# Patient Record
Sex: MALE | Race: WHITE | NOT HISPANIC OR LATINO | Employment: FULL TIME | URBAN - METROPOLITAN AREA
[De-identification: names, ages, dates, MRNs, and addresses within clinical notes are randomized per-mention and may not be internally consistent; named-entity substitution may affect disease eponyms.]

---

## 2017-02-02 ENCOUNTER — ALLSCRIPTS OFFICE VISIT (OUTPATIENT)
Dept: OTHER | Facility: OTHER | Age: 27
End: 2017-02-02

## 2017-03-03 ENCOUNTER — ALLSCRIPTS OFFICE VISIT (OUTPATIENT)
Dept: OTHER | Facility: OTHER | Age: 27
End: 2017-03-03

## 2018-01-11 NOTE — PROGRESS NOTES
Assessment    1  Encounter for preventive health examination (V70 0) (Z00 00)    Plan  Health Maintenance    · Follow-up visit in 1 year Evaluation and Treatment  Follow-up  Status: Hold For -  Scheduling  Requested for: [id number]   · Begin a limited exercise program ; Status:Complete;   Done: [id number] 03:56PM   · Begin or continue regular aerobic exercise  Gradually work up to at least 3 sessions of 30  minutes of exercise a week ; Status:Complete;   Done: 78ZGX7655 03:56PM   · Brush your teeth 3 times a day and floss at least once a day ; Status:Complete;   Done:  44CPD0981 03:56PM   · Drink plenty of fluids ; Status:Complete;   Done: 47GAU0000 03:56PM   · Eat a low fat and low cholesterol diet ; Status:Complete;   Done: 20XLC6191 03:56PM   · Use a sun block product with an SPF of 15 or more ; Status:Complete;   Done:  62FYK6999 03:56PM   · Vitamins can help you get daily requirements that your diet may not be giving you ;  Status:Complete;   Done: 95NLQ7755 03:56PM   · We recommend routine visits to a dentist ; Status:Complete;   Done: 30DER3834 03:56PM    Discussion/Summary  Impression: health maintenance visit  Currently, he eats an adequate diet and has an adequate exercise regimen  Prostate cancer screening: the risks and benefits of prostate cancer screening were discussed and PSA was ordered  Testicular cancer screening: the risks and benefits of testicular cancer screening were discussed and monthly self testicular exam was advised  Colorectal cancer screening: the risks and benefits of colorectal cancer screening were discussed and colorectal cancer screening is not indicated  The immunizations are up to date  Advice and education were given regarding aerobic exercise, weight bearing exercise, weight loss, calcium supplements, vitamin D supplements, reproductive health, cardiovascular risk reduction, alcohol use, sunscreen use, helmet use and seat belt use        Chief Complaint  pt here for physical/ History of Present Illness  HM, Adult Male: The patient is being seen for a health maintenance evaluation  The last health maintenance visit was 1 year(s) ago  General Health: The patient's health since the last visit is described as good  He has regular dental visits  He denies vision problems  Vision care includes previous LASIK eye surgery  He denies hearing loss  Immunizations status: not up to date  Lifestyle:  He consumes a diverse and healthy diet  He does not have any weight concerns  He exercises regularly  He does not use tobacco  He consumes alcohol  He denies drug use  Reproductive health:  the patient is sexually active  birth control is not being practiced  He denies erectile dysfunction  Screening: cancer screening reviewed and updated  metabolic screening reviewed and updated  risk screening reviewed and updated  Review of Systems    Constitutional: No fever or chills, feels well, no tiredness, no recent weight gain or weight loss  Eyes: No complaints of eye pain, no red eyes, no discharge from eyes, no itchy eyes  ENT: no complaints of earache, no hearing loss, no nosebleeds, no nasal discharge, no sore throat, no hoarseness  Cardiovascular: No complaints of slow heart rate, no fast heart rate, no chest pain, no palpitations, no leg claudication, no lower extremity  Respiratory: No complaints of shortness of breath, no wheezing, no cough, no SOB on exertion, no orthopnea or PND  Gastrointestinal: No complaints of abdominal pain, no constipation, no nausea or vomiting, no diarrhea or bloody stools  Genitourinary: No complaints of dysuria, no incontinence, no hesitancy, no nocturia, no genital lesion, no testicular pain  Musculoskeletal: No complaints of arthralgia, no myalgias, no joint swelling or stiffness, no limb pain or swelling  Integumentary: No complaints of skin rash or skin lesions, no itching, no skin wound, no dry skin     Neurological: No compliants of headache, no confusion, no convulsions, no numbness or tingling, no dizziness or fainting, no limb weakness, no difficulty walking  Psychiatric: Is not suicidal, no sleep disturbances, no anxiety or depression, no change in personality, no emotional problems  Endocrine: No complaints of proptosis, no hot flashes, no muscle weakness, no erectile dysfunction, no deepening of the voice, no feelings of weakness  Hematologic/Lymphatic: No complaints of swollen glands, no swollen glands in the neck, does not bleed easily, no easy bruising  Past Medical History    · History of Internal derangement of knee, left (717 9) (M23 92)    Family History  Mother    · Family history of Hodgkin's lymphoma (V16 7) (Z80 7)    Social History    · Never a smoker   · Social alcohol use (Z78 9)    Allergies    1  No Known Drug Allergies    Vitals   Recorded: 60AUG2517 03:54PM Recorded: 66MME2721 03:39PM   Heart Rate  78   Respiration  18   Systolic 486, LUE, Sitting 420   Diastolic 80, LUE, Sitting 96   Height  6 ft 1 86 in   Weight  272 lb    BMI Calculated  35 06   BSA Calculated  2 47     Physical Exam    Constitutional   General appearance: No acute distress, well appearing and well nourished  Head and Face   Head and face: Normal     Palpation of the face and sinuses: No sinus tenderness  Eyes   Conjunctiva and lids: No erythema, swelling or discharge  Pupils and irises: Equal, round, reactive to light  Ophthalmoscopic examination: Normal fundi and optic discs  Ears, Nose, Mouth, and Throat   External inspection of ears and nose: Normal     Otoscopic examination: Tympanic membranes translucent with normal light reflex  Canals patent without erythema  Hearing: Normal     Nasal mucosa, septum, and turbinates: Normal without edema or erythema  Lips, teeth, and gums: Normal, good dentition  Oropharynx: Normal with no erythema, edema, exudate or lesions      Neck   Neck: Supple, symmetric, trachea midline, no masses  Thyroid: Normal, no thyromegaly  Pulmonary   Respiratory effort: No increased work of breathing or signs of respiratory distress  Percussion of chest: Normal     Auscultation of lungs: Clear to auscultation  Cardiovascular   Palpation of heart: Normal PMI, no thrills  Auscultation of heart: Normal rate and rhythm, normal S1 and S2, no murmurs  Examination of extremities for edema and/or varicosities: Normal     Chest   Chest: Normal     Abdomen   Abdomen: Non-tender, no masses  Liver and spleen: No hepatomegaly or splenomegaly  Examination for hernias: No hernias appreciated  Lymphatic   Palpation of lymph nodes in neck: No lymphadenopathy  Palpation of lymph nodes in axillae: No lymphadenopathy  Palpation of lymph nodes in groin: No lymphadenopathy  Musculoskeletal   Gait and station: Normal     Inspection/palpation of digits and nails: Normal without clubbing or cyanosis  Inspection/palpation of joints, bones, and muscles: Normal     Range of motion: Normal     Stability: Normal     Muscle strength/tone: Normal     Skin   Skin and subcutaneous tissue: Normal without rashes or lesions  Palpation of skin and subcutaneous tissue: Normal turgor  Neurologic   Cranial nerves: Cranial nerves 2-12 intact  Cortical function: Normal mental status  Reflexes: 2+ and symmetric  Sensation: No sensory loss  Coordination: Normal finger to nose and heel to shin  Psychiatric   Judgment and insight: Normal     Orientation to person, place and time: Normal     Recent and remote memory: Intact      Mood and affect: Normal        Signatures   Electronically signed by : Jenniffer Serna MD; Mar  3 2017  4:00PM EST                       (Author)

## 2018-01-12 VITALS
RESPIRATION RATE: 16 BRPM | HEIGHT: 74 IN | TEMPERATURE: 98.3 F | HEART RATE: 68 BPM | SYSTOLIC BLOOD PRESSURE: 132 MMHG | WEIGHT: 270 LBS | BODY MASS INDEX: 34.65 KG/M2 | DIASTOLIC BLOOD PRESSURE: 78 MMHG

## 2018-01-15 VITALS
BODY MASS INDEX: 34.91 KG/M2 | HEIGHT: 74 IN | DIASTOLIC BLOOD PRESSURE: 80 MMHG | RESPIRATION RATE: 18 BRPM | SYSTOLIC BLOOD PRESSURE: 120 MMHG | WEIGHT: 272 LBS | HEART RATE: 78 BPM

## 2018-11-28 ENCOUNTER — OFFICE VISIT (OUTPATIENT)
Dept: FAMILY MEDICINE CLINIC | Facility: CLINIC | Age: 28
End: 2018-11-28
Payer: COMMERCIAL

## 2018-11-28 VITALS
HEART RATE: 111 BPM | OXYGEN SATURATION: 98 % | HEIGHT: 75 IN | SYSTOLIC BLOOD PRESSURE: 140 MMHG | DIASTOLIC BLOOD PRESSURE: 82 MMHG | BODY MASS INDEX: 34.94 KG/M2 | RESPIRATION RATE: 18 BRPM | WEIGHT: 281 LBS

## 2018-11-28 DIAGNOSIS — Z00.00 ANNUAL PHYSICAL EXAM: Primary | ICD-10-CM

## 2018-11-28 DIAGNOSIS — G43.909 MIGRAINE WITHOUT STATUS MIGRAINOSUS, NOT INTRACTABLE, UNSPECIFIED MIGRAINE TYPE: ICD-10-CM

## 2018-11-28 DIAGNOSIS — Z23 NEED FOR INFLUENZA VACCINATION: ICD-10-CM

## 2018-11-28 DIAGNOSIS — R07.9 CHEST PAIN, UNSPECIFIED TYPE: ICD-10-CM

## 2018-11-28 PROCEDURE — 99395 PREV VISIT EST AGE 18-39: CPT | Performed by: FAMILY MEDICINE

## 2018-11-28 PROCEDURE — 90471 IMMUNIZATION ADMIN: CPT

## 2018-11-28 PROCEDURE — 90686 IIV4 VACC NO PRSV 0.5 ML IM: CPT

## 2018-11-28 RX ORDER — ONDANSETRON 4 MG/1
4 TABLET, ORALLY DISINTEGRATING ORAL EVERY 6 HOURS PRN
Qty: 20 TABLET | Refills: 0 | Status: SHIPPED | OUTPATIENT
Start: 2018-11-28 | End: 2022-03-09 | Stop reason: SDUPTHER

## 2018-11-28 NOTE — PATIENT INSTRUCTIONS
Wellness Visit for Adults   AMBULATORY CARE:   A wellness visit  is when you see your healthcare provider to get screened for health problems  You can also get advice on how to stay healthy  Write down your questions so you remember to ask them  Ask your healthcare provider how often you should have a wellness visit  What happens at a wellness visit:  Your healthcare provider will ask about your health, and your family history of health problems  This includes high blood pressure, heart disease, and cancer  He or she will ask if you have symptoms that concern you, if you smoke, and about your mood  You may also be asked about your intake of medicines, supplements, food, and alcohol  Any of the following may be done:  · Your weight  will be checked  Your height may also be checked so your body mass index (BMI) can be calculated  Your BMI shows if you are at a healthy weight  · Your blood pressure  and heart rate will be checked  Your temperature may also be checked  · Blood and urine tests  may be done  Blood tests may be done to check your cholesterol levels  Abnormal cholesterol levels increase your risk for heart disease and stroke  You may also need a blood or urine test to check for diabetes if you are at increased risk  Urine tests may be done to look for signs of an infection or kidney disease  · A physical exam  includes checking your heartbeat and lungs with a stethoscope  Your healthcare provider may also check your skin to look for sun damage  · Screening tests  may be recommended  A screening test is done to check for diseases that may not cause symptoms  The screening tests you may need depend on your age, gender, family history, and lifestyle habits  For example, colorectal screening may be recommended if you are 48years old or older  Screening tests you need if you are a woman:   · A Pap smear  is used to screen for cervical cancer   Pap smears are usually done every 3 to 5 years depending on your age  You may need them more often if you have had abnormal Pap smear test results in the past  Ask your healthcare provider how often you should have a Pap smear  · A mammogram  is an x-ray of your breasts to screen for breast cancer  Experts recommend mammograms every 2 years starting at age 48 years  You may need a mammogram at age 52 years or younger if you have an increased risk for breast cancer  Talk to your healthcare provider about when you should start having mammograms and how often you need them  Vaccines you may need:   · Get an influenza vaccine  every year  The influenza vaccine protects you from the flu  Several types of viruses cause the flu  The viruses change over time, so new vaccines are made each year  · Get a tetanus-diphtheria (Td) booster vaccine  every 10 years  This vaccine protects you against tetanus and diphtheria  Tetanus is a severe infection that may cause painful muscle spasms and lockjaw  Diphtheria is a severe bacterial infection that causes a thick covering in the back of your mouth and throat  · Get a human papillomavirus (HPV) vaccine  if you are female and aged 23 to 32 or male 23 to 24 and never received it  This vaccine protects you from HPV infection  HPV is the most common infection spread by sexual contact  HPV may also cause vaginal, penile, and anal cancers  · Get a pneumococcal vaccine  if you are aged 72 years or older  The pneumococcal vaccine is an injection given to protect you from pneumococcal disease  Pneumococcal disease is an infection caused by pneumococcal bacteria  The infection may cause pneumonia, meningitis, or an ear infection  · Get a shingles vaccine  if you are aged 61 or older, even if you have had shingles before  The shingles vaccine is an injection to protect you from the varicella-zoster virus  This is the same virus that causes chickenpox   Shingles is a painful rash that develops in people who had chickenpox or have been exposed to the virus  How to eat healthy:  My Plate is a model for planning healthy meals  It shows the types and amounts of foods that should go on your plate  Fruits and vegetables make up about half of your plate, and grains and protein make up the other half  A serving of dairy is included on the side of your plate  The amount of calories and serving sizes you need depends on your age, gender, weight, and height  Examples of healthy foods are listed below:  · Eat a variety of vegetables  such as dark green, red, and orange vegetables  You can also include canned vegetables low in sodium (salt) and frozen vegetables without added butter or sauces  · Eat a variety of fresh fruits , canned fruit in 100% juice, frozen fruit, and dried fruit  · Include whole grains  At least half of the grains you eat should be whole grains  Examples include whole-wheat bread, wheat pasta, brown rice, and whole-grain cereals such as oatmeal     · Eat a variety of protein foods such as seafood (fish and shellfish), lean meat, and poultry without skin (turkey and chicken)  Examples of lean meats include pork leg, shoulder, or tenderloin, and beef round, sirloin, tenderloin, and extra lean ground beef  Other protein foods include eggs and egg substitutes, beans, peas, soy products, nuts, and seeds  · Choose low-fat dairy products such as skim or 1% milk or low-fat yogurt, cheese, and cottage cheese  · Limit unhealthy fats  such as butter, hard margarine, and shortening  Exercise:  Exercise at least 30 minutes per day on most days of the week  Some examples of exercise include walking, biking, dancing, and swimming  You can also fit in more physical activity by taking the stairs instead of the elevator or parking farther away from stores  Include muscle strengthening activities 2 days each week  Regular exercise provides many health benefits   It helps you manage your weight, and decreases your risk for type 2 diabetes, heart disease, stroke, and high blood pressure  Exercise can also help improve your mood  Ask your healthcare provider about the best exercise plan for you  General health and safety guidelines:   · Do not smoke  Nicotine and other chemicals in cigarettes and cigars can cause lung damage  Ask your healthcare provider for information if you currently smoke and need help to quit  E-cigarettes or smokeless tobacco still contain nicotine  Talk to your healthcare provider before you use these products  · Limit alcohol  A drink of alcohol is 12 ounces of beer, 5 ounces of wine, or 1½ ounces of liquor  · Lose weight, if needed  Being overweight increases your risk of certain health conditions  These include heart disease, high blood pressure, type 2 diabetes, and certain types of cancer  · Protect your skin  Do not sunbathe or use tanning beds  Use sunscreen with a SPF 15 or higher  Apply sunscreen at least 15 minutes before you go outside  Reapply sunscreen every 2 hours  Wear protective clothing, hats, and sunglasses when you are outside  · Drive safely  Always wear your seatbelt  Make sure everyone in your car wears a seatbelt  A seatbelt can save your life if you are in an accident  Do not use your cell phone when you are driving  This could distract you and cause an accident  Pull over if you need to make a call or send a text message  · Practice safe sex  Use latex condoms if are sexually active and have more than one partner  Your healthcare provider may recommend screening tests for sexually transmitted infections (STIs)  · Wear helmets, lifejackets, and protective gear  Always wear a helmet when you ride a bike or motorcycle, go skiing, or play sports that could cause a head injury  Wear protective equipment when you play sports  Wear a lifejacket when you are on a boat or doing water sports    © 2017 [employer] Information is for End User's use only and may not be sold, redistributed or otherwise used for commercial purposes  All illustrations and images included in CareNotes® are the copyrighted property of [employer]  or [employer]  The above information is an  only  It is not intended as medical advice for individual conditions or treatments  Talk to your doctor, nurse or pharmacist before following any medical regimen to see if it is safe and effective for you  Weight Management   AMBULATORY CARE:   Why it is important to manage your weight:  Being overweight increases your risk of health conditions such as heart disease, high blood pressure, type 2 diabetes, and certain types of cancer  It can also increase your risk for osteoarthritis, sleep apnea, and other respiratory problems  Aim for a slow, steady weight loss  Even a small amount of weight loss can lower your risk of health problems  How to lose weight safely:  A safe and healthy way to lose weight is to eat fewer calories and get regular exercise  You can lose up about 1 pound a week by decreasing the number of calories you eat by 500 calories each day  You can decrease calories by eating smaller portion sizes or by cutting out high-calorie foods  Read labels to find out how many calories are in the foods you eat  You can also burn calories with exercise such as walking, swimming, or biking  You will be more likely to keep weight off if you make these changes part of your lifestyle  Healthy meal plan for weight management:  A healthy meal plan includes a variety of foods, contains fewer calories, and helps you stay healthy  A healthy meal plan includes the following:  · Eat whole-grain foods more often  A healthy meal plan should contain fiber  Fiber is the part of grains, fruits, and vegetables that is not broken down by your body  Whole-grain foods are healthy and provide extra fiber in your diet   Some examples of whole-grain foods are whole-wheat breads and pastas, oatmeal, brown rice, and bulgur  · Eat a variety of vegetables every day  Include dark, leafy greens such as spinach, kale, stacie greens, and mustard greens  Eat yellow and orange vegetables such as carrots, sweet potatoes, and winter squash  · Eat a variety of fruits every day  Choose fresh or canned fruit (canned in its own juice or light syrup) instead of juice  Fruit juice has very little or no fiber  · Eat low-fat dairy foods  Drink fat-free (skim) milk or 1% milk  Eat fat-free yogurt and low-fat cottage cheese  Try low-fat cheeses such as mozzarella and other reduced-fat cheeses  · Choose meat and other protein foods that are low in fat  Choose beans or other legumes such as split peas or lentils  Choose fish, skinless poultry (chicken or turkey), or lean cuts of red meat (beef or pork)  Before you cook meat or poultry, cut off any visible fat  · Use less fat and oil  Try baking foods instead of frying them  Add less fat, such as margarine, sour cream, regular salad dressing and mayonnaise to foods  Eat fewer high-fat foods  Some examples of high-fat foods include french fries, doughnuts, ice cream, and cakes  · Eat fewer sweets  Limit foods and drinks that are high in sugar  This includes candy, cookies, regular soda, and sweetened drinks  Ways to decrease calories:   · Eat smaller portions  ¨ Use a small plate with smaller servings  ¨ Do not eat second helpings  ¨ When you eat at a restaurant, ask for a box and place half of your meal in the box before you eat  ¨ Share an entrée with someone else  · Replace high-calorie snacks with healthy, low-calorie snacks  ¨ Choose fresh fruit, vegetables, fat-free rice cakes, or air-popped popcorn instead of potato chips, nuts, or chocolate  ¨ Choose water or calorie-free drinks instead of soda or sweetened drinks  · Eat regular meals  Skipping meals can lead to overeating later in the day   Eat a healthy snack in place of a meal if you do not have time to eat a regular meal      · Do not shop for groceries when you are hungry  You may be more likely to make unhealthy food choices  Take a grocery list of healthy foods and shop after you have eaten  Exercise:  Exercise at least 30 minutes per day on most days of the week  Some examples of exercise include walking, biking, dancing, and swimming  You can also fit in more physical activity by taking the stairs instead of the elevator or parking farther away from stores  Ask your healthcare provider about the best exercise plan for you  Other things to consider as you try to lose weight:   · Be aware of situations that may give you the urge to overeat, such as eating while watching television  Find ways to avoid these situations  For example, read a book, go for a walk, or do crafts  · Meet with a weight loss support group or friends who are also trying to lose weight  This may help you stay motivated to continue working on your weight loss goals  © 2017 [employer] Information is for End User's use only and may not be sold, redistributed or otherwise used for commercial purposes  All illustrations and images included in CareNotes® are the copyrighted property of [employer]  or [employer]  The above information is an  only  It is not intended as medical advice for individual conditions or treatments  Talk to your doctor, nurse or pharmacist before following any medical regimen to see if it is safe and effective for you

## 2018-11-28 NOTE — PROGRESS NOTES
ADULT ANNUAL PHYSICAL  [facility] - [facility]    NAME: Kiran Olivas  AGE: 29 y o  SEX: male  : 1990     DATE: 2018     Assessment and Plan:     Diagnoses and all orders for this visit:    Annual physical exam    Need for influenza vaccination  -     SYRINGE/SINGLE-DOSE VIAL: influenza vaccine, [id number], quadrivalent, 0 5 mL, preservative-free, for patients 3+ yr (FLUZONE)    Chest pain, unspecified type  -     Stress test only, exercise; Future  -     Comprehensive metabolic panel  -     Lipid Panel with Direct LDL reflex; Future  -     CBC and differential; Future    Migraine without status migrainosus, not intractable, unspecified migraine type  -     ondansetron (ZOFRAN-ODT) 4 mg disintegrating tablet; Take 1 tablet (4 mg total) by mouth every 6 (six) hours as needed for nausea or vomiting        Health maintenance and preventative care screenings were discussed with patient today  Appropriate education was printed on patient's after visit summary  · Discussed risks/benefits of screening for high cholesterol and diabetes  Patient agrees to screening for high cholesterol and diabetes  · Immunizations were reviewed: patient agrees to influenza vaccine  Counseling:  Dental Health: discussed importance of regular tooth brushing, flossing, and dental visits  Injury prevention: discussed safety/seat belts, safety helmets, smoke detectors, carbon dioxide detectors, and smoking near bedding or upholstery  BMI Counseling: Body mass index is 35 12 kg/m²  Discussed with patient's BMI with him  The BMI is above average  BMI counseling and education was provided to the patient   Nutrition recommendations include reducing portion sizes, decreasing overall calorie intake, 3-5 servings of fruits/vegetables daily, reducing fast food intake, consuming healthier snacks, decreasing soda and/or juice intake, moderation in carbohydrate intake, increasing intake of lean protein, reducing intake of saturated fat and trans fat and reducing intake of cholesterol  Exercise recommendations include moderate aerobic physical activity for 150 minutes/week  Sexual health: discussed sexually transmitted diseases, partner selection, use of condoms, avoidance of unintended pregnancy, and contraceptive alternatives  · Alcohol/drug use: discussed moderation in alcohol intake and avoidance of illicit drug use  Return in about 1 year (around 11/28/2019) for Annual physical      Chief Complaint:     Chief Complaint   Patient presents with    Annual Exam   chest pain on and off   +family h/o heart attack in Mom    History of Present Illness:     Adult Annual Physical   Patient here for a comprehensive physical exam  The patient reports no problems  Diet and Physical Activity  · Diet/Nutrition: well balanced diet and consuming 3-5 servings of fruits/vegetables daily  · Weight concerns: patient has class 2 obesity (BMI 35 0-39 9)  · Exercise: moderate cardiovascular exercise and 1-2 times a week on average  Depression Screening  PHQ-9 Depression Screening    PHQ-9:    Frequency of the following problems over the past two weeks:       Little interest or pleasure in doing things:  0 - not at all  Feeling down, depressed, or hopeless:  0 - not at all  PHQ-2 Score:  0       General Health  · Sleep: gets 4-6 hours of sleep on average  · Hearing: normal - bilateral   · Vision: no vision problems and most recent eye exam >1 year ago  · Dental: regular dental visits and brushes teeth twice daily          Health  · History of STDs?: no   · Erectile dysfunction: no        Review of Systems:     Review of Systems   Past Medical History:     Past Medical History:   Diagnosis Date    Internal derangement of knee     Left - last assessed: Dec 22, 2015      Past Surgical History:     Past Surgical History:   Procedure Laterality Date    KNEE CARTILAGE SURGERY Bilateral       Social History: Social History     Social History    Marital status: Single     Spouse name: N/A    Number of children: N/A    Years of education: N/A     Social History Main Topics    Smoking status: Never Smoker    Smokeless tobacco: Never Used    Alcohol use Yes      Comment: social     Drug use: Unknown    Sexual activity: Not Asked     Other Topics Concern    None     Social History Narrative    None      Family History:     Family History   Problem Relation Age of Onset    Hodgkin's lymphoma Mother       Current Medications:     Current Outpatient Prescriptions   Medication Sig Dispense Refill    ondansetron (ZOFRAN-ODT) 4 mg disintegrating tablet Take 1 tablet (4 mg total) by mouth every 6 (six) hours as needed for nausea or vomiting 20 tablet 0     No current facility-administered medications for this visit         Allergies:     No Known Allergies   Objective:     /82 (BP Location: Left arm, Patient Position: Sitting, Cuff Size: Standard)   Pulse (!) 111   Resp 18   Ht 6' 3" (1 905 m)   Wt 127 kg (281 lb)   SpO2 98%   BMI 35 12 kg/m²   Physical Exam     Health Maintenance:     Health Maintenance Topics with due status: Not Due       Topic Last Completion Date    Depression Screening [facility] 11/28/2018     Health Maintenance Topics with due status: Overdue       Topic Date Due    DTaP,Tdap,and Td Vaccines 07/27/2011    INFLUENZA VACCINE 07/01/2018     Immunization History   Administered Date(s) Administered    Influenza Quadrivalent Preservative Free 3 years and older IM 02/02/2017       Rutherford Goodpasture, MD  [address] [facility]

## 2018-12-10 ENCOUNTER — HOSPITAL ENCOUNTER (OUTPATIENT)
Dept: NON INVASIVE DIAGNOSTICS | Facility: CLINIC | Age: 28
Discharge: HOME | End: 2018-12-10
Payer: COMMERCIAL

## 2018-12-10 ENCOUNTER — APPOINTMENT (OUTPATIENT)
Dept: LAB | Facility: CLINIC | Age: 28
End: 2018-12-10
Payer: COMMERCIAL

## 2018-12-10 DIAGNOSIS — R07.9 CHEST PAIN, UNSPECIFIED TYPE: ICD-10-CM

## 2018-12-10 LAB
ALBUMIN SERPL BCP-MCNC: 4.3 G/DL (ref 3.5–5)
ALP SERPL-CCNC: 96 U/L (ref 46–116)
ALT SERPL W P-5'-P-CCNC: 65 U/L (ref 12–78)
ANION GAP SERPL CALCULATED.3IONS-SCNC: 9 MMOL/L (ref 4–13)
AST SERPL W P-5'-P-CCNC: 25 U/L (ref 5–45)
BASOPHILS # BLD AUTO: 0.04 THOUSANDS/ΜL (ref 0–0.1)
BASOPHILS NFR BLD AUTO: 1 % (ref 0–1)
BILIRUB SERPL-MCNC: 0.66 MG/DL (ref 0.2–1)
BUN SERPL-MCNC: 13 MG/DL (ref 5–25)
CALCIUM ALBUM COR SERPL-MCNC: 10 MG/DL (ref 8.3–10.1)
CALCIUM SERPL-MCNC: 10.2 MG/DL (ref 8.3–10.1)
CHLORIDE SERPL-SCNC: 107 MMOL/L (ref 100–108)
CHOLEST SERPL-MCNC: 260 MG/DL (ref 50–200)
CO2 SERPL-SCNC: 23 MMOL/L (ref 21–32)
CREAT SERPL-MCNC: 1.29 MG/DL (ref 0.6–1.3)
EOSINOPHIL # BLD AUTO: 0.13 THOUSAND/ΜL (ref 0–0.61)
EOSINOPHIL NFR BLD AUTO: 3 % (ref 0–6)
ERYTHROCYTE [DISTWIDTH] IN BLOOD BY AUTOMATED COUNT: 12.9 % (ref 11.6–15.1)
GFR SERPL CREATININE-BSD FRML MDRD: 75 ML/MIN/1.73SQ M
GLUCOSE P FAST SERPL-MCNC: 85 MG/DL (ref 65–99)
HCT VFR BLD AUTO: 48.5 % (ref 36.5–49.3)
HDLC SERPL-MCNC: 35 MG/DL (ref 40–60)
HGB BLD-MCNC: 16.5 G/DL (ref 12–17)
IMM GRANULOCYTES # BLD AUTO: 0 THOUSAND/UL (ref 0–0.2)
IMM GRANULOCYTES NFR BLD AUTO: 0 % (ref 0–2)
LDLC SERPL CALC-MCNC: 173 MG/DL (ref 0–100)
LYMPHOCYTES # BLD AUTO: 2.35 THOUSANDS/ΜL (ref 0.6–4.47)
LYMPHOCYTES NFR BLD AUTO: 45 % (ref 14–44)
MCH RBC QN AUTO: 30.2 PG (ref 26.8–34.3)
MCHC RBC AUTO-ENTMCNC: 34 G/DL (ref 31.4–37.4)
MCV RBC AUTO: 89 FL (ref 82–98)
MONOCYTES # BLD AUTO: 0.43 THOUSAND/ΜL (ref 0.17–1.22)
MONOCYTES NFR BLD AUTO: 8 % (ref 4–12)
NEUTROPHILS # BLD AUTO: 2.25 THOUSANDS/ΜL (ref 1.85–7.62)
NEUTS SEG NFR BLD AUTO: 43 % (ref 43–75)
NRBC BLD AUTO-RTO: 0 /100 WBCS
PLATELET # BLD AUTO: 229 THOUSANDS/UL (ref 149–390)
PMV BLD AUTO: 10.2 FL (ref 8.9–12.7)
POTASSIUM SERPL-SCNC: 4 MMOL/L (ref 3.5–5.3)
PROT SERPL-MCNC: 7.9 G/DL (ref 6.4–8.2)
RBC # BLD AUTO: 5.46 MILLION/UL (ref 3.88–5.62)
SODIUM SERPL-SCNC: 139 MMOL/L (ref 136–145)
TRIGL SERPL-MCNC: 261 MG/DL
WBC # BLD AUTO: 5.2 THOUSAND/UL (ref 4.31–10.16)

## 2018-12-10 PROCEDURE — 36415 COLL VENOUS BLD VENIPUNCTURE: CPT | Performed by: FAMILY MEDICINE

## 2018-12-10 PROCEDURE — 85025 COMPLETE CBC W/AUTO DIFF WBC: CPT

## 2018-12-10 PROCEDURE — 80053 COMPREHEN METABOLIC PANEL: CPT | Performed by: FAMILY MEDICINE

## 2018-12-10 PROCEDURE — 80061 LIPID PANEL: CPT

## 2018-12-10 PROCEDURE — 93017 CV STRESS TEST TRACING ONLY: CPT

## 2018-12-10 PROCEDURE — 93016 CV STRESS TEST SUPVJ ONLY: CPT | Performed by: INTERNAL MEDICINE

## 2018-12-10 PROCEDURE — 93018 CV STRESS TEST I&R ONLY: CPT | Performed by: INTERNAL MEDICINE

## 2018-12-11 LAB
CHEST PAIN STATEMENT: NORMAL
MAX DIASTOLIC BP: 90 MMHG
MAX HEART RATE: 196 BPM
MAX PREDICTED HEART RATE: 192 BPM
MAX. SYSTOLIC BP: 170 MMHG
PROTOCOL NAME: NORMAL
REASON FOR TERMINATION: NORMAL
TARGET HR FORMULA: NORMAL
TEST INDICATION: NORMAL
TIME IN EXERCISE PHASE: NORMAL

## 2019-12-06 ENCOUNTER — OFFICE VISIT (OUTPATIENT)
Dept: FAMILY MEDICINE CLINIC | Facility: CLINIC | Age: 29
End: 2019-12-06
Payer: COMMERCIAL

## 2019-12-06 VITALS
SYSTOLIC BLOOD PRESSURE: 134 MMHG | BODY MASS INDEX: 36.04 KG/M2 | TEMPERATURE: 97.7 F | RESPIRATION RATE: 16 BRPM | DIASTOLIC BLOOD PRESSURE: 90 MMHG | HEIGHT: 74 IN | HEART RATE: 74 BPM | WEIGHT: 280.8 LBS | OXYGEN SATURATION: 98 %

## 2019-12-06 DIAGNOSIS — Z23 NEED FOR DIPHTHERIA-TETANUS-PERTUSSIS (TDAP) VACCINE: ICD-10-CM

## 2019-12-06 DIAGNOSIS — E78.2 MIXED HYPERLIPIDEMIA: ICD-10-CM

## 2019-12-06 DIAGNOSIS — Z23 FLU VACCINE NEED: ICD-10-CM

## 2019-12-06 DIAGNOSIS — Z00.00 ANNUAL PHYSICAL EXAM: Primary | ICD-10-CM

## 2019-12-06 PROCEDURE — 90471 IMMUNIZATION ADMIN: CPT

## 2019-12-06 PROCEDURE — 99395 PREV VISIT EST AGE 18-39: CPT | Performed by: FAMILY MEDICINE

## 2019-12-06 PROCEDURE — 90715 TDAP VACCINE 7 YRS/> IM: CPT

## 2019-12-06 PROCEDURE — 90686 IIV4 VACC NO PRSV 0.5 ML IM: CPT

## 2019-12-06 PROCEDURE — 90472 IMMUNIZATION ADMIN EACH ADD: CPT

## 2019-12-06 NOTE — PROGRESS NOTES
[address]    NAME: Alon Senior  AGE: 34 y o  SEX: male  : 1990     DATE: 2019     Assessment and Plan:     Problem List Items Addressed This Visit     None      Visit Diagnoses     Annual physical exam    -  Primary    Mixed hyperlipidemia        Relevant Orders    Comprehensive metabolic panel    Lipid Panel with Direct LDL reflex    Flu vaccine need        Relevant Orders    influenza vaccine, [id number], quadrivalent, 0 5 mL, preservative-free, for adult and pediatric patients 6 mos+ (AFLURIA, FLUARIX, FLULAVAL, FLUZONE)    Need for diphtheria-tetanus-pertussis (Tdap) vaccine        Relevant Orders    TDAP VACCINE GREATER THAN OR EQUAL TO 6YO IM          Immunizations and preventive care screenings were discussed with patient today  Appropriate education was printed on patient's after visit summary  Counseling:  Alcohol/drug use: discussed moderation in alcohol intake, the recommendations for healthy alcohol use, and avoidance of illicit drug use  Dental Health: discussed importance of regular tooth brushing, flossing, and dental visits  Injury prevention: discussed safety/seat belts, safety helmets, smoke detectors, carbon dioxide detectors, and smoking near bedding or upholstery  Sexual health: discussed sexually transmitted diseases, partner selection, use of condoms, avoidance of unintended pregnancy, and contraceptive alternatives  · Exercise: the importance of regular exercise/physical activity was discussed  Recommend exercise 3-5 times per week for at least 30 minutes  BMI Counseling: Body mass index is 36 23 kg/m²  The BMI is above normal  Nutrition recommendations include decreasing portion sizes and encouraging healthy choices of fruits and vegetables  Exercise recommendations include moderate physical activity 150 minutes/week  No pharmacotherapy was ordered  No follow-ups on file  Chief Complaint:     Chief Complaint   Patient presents with    Annual Exam     patient is here for physical exam      History of Present Illness:     Adult Annual Physical   Patient here for a comprehensive physical exam  The patient reports no problems  Diet and Physical Activity  · Diet/Nutrition: well balanced diet and consuming 3-5 servings of fruits/vegetables daily  · Exercise: 1-2 times a week on average  Depression Screening  PHQ-9 Depression Screening    PHQ-9:    Frequency of the following problems over the past two weeks:       Little interest or pleasure in doing things:  0 - not at all  Feeling down, depressed, or hopeless:  0 - not at all  PHQ-2 Score:  0       General Health  · Sleep: gets 7-8 hours of sleep on average  · Hearing: normal - bilateral   · Vision: goes for regular eye exams and most recent eye exam <1 year ago  Had Laser surgery 2010  · Dental: regular dental visits and brushes teeth twice daily   Health  · History of STDs?: no      Review of Systems:     Review of Systems   Constitutional: Negative  HENT: Negative  Eyes: Negative  Respiratory: Negative  Cardiovascular: Negative  Gastrointestinal: Negative  Endocrine: Negative  Genitourinary: Negative  Musculoskeletal: Negative  Skin: Negative  Allergic/Immunologic: Negative  Neurological: Negative  Hematological: Negative  Psychiatric/Behavioral: Negative         Past Medical History:     Past Medical History:   Diagnosis Date    Internal derangement of knee     Left - last assessed: Dec 22, 2015      Past Surgical History:     Past Surgical History:   Procedure Laterality Date    KNEE CARTILAGE SURGERY Left     2 surgeries    TRICEPS TENDON RELEASE Left     tricep repair    WISDOM TOOTH EXTRACTION        Social History:     Social History     Socioeconomic History    Marital status: Single     Spouse name: None    Number of children: None    Years of education: None    Highest education level: None   Occupational History    None   Social Needs    Financial resource strain: None    Food insecurity:     Worry: None     Inability: None    Transportation needs:     Medical: None     Non-medical: None   Tobacco Use    Smoking status: Never Smoker    Smokeless tobacco: Never Used   Substance and Sexual Activity    Alcohol use: Yes     Comment: social     Drug use: None    Sexual activity: None   Lifestyle    Physical activity:     Days per week: None     Minutes per session: None    Stress: None   Relationships    Social connections:     Talks on phone: None     Gets together: None     Attends Jain service: None     Active member of club or organization: None     Attends meetings of clubs or organizations: None     Relationship status: None    Intimate partner violence:     Fear of current or ex partner: None     Emotionally abused: None     Physically abused: None     Forced sexual activity: None   Other Topics Concern    None   Social History Narrative    None      Family History:     Family History   Problem Relation Age of Onset    Hodgkin's lymphoma Mother     Heart defect Mother     No Known Problems Father       Current Medications:     Current Outpatient Medications   Medication Sig Dispense Refill    ondansetron (ZOFRAN-ODT) 4 mg disintegrating tablet Take 1 tablet (4 mg total) by mouth every 6 (six) hours as needed for nausea or vomiting 20 tablet 0     No current facility-administered medications for this visit  Allergies:     No Known Allergies   Physical Exam:     /90 (BP Location: Left arm, Patient Position: Sitting, Cuff Size: Standard)   Pulse 74   Temp 97 7 °F (36 5 °C) (Tympanic)   Resp 16   Ht 6' 1 82" (1 875 m)   Wt 127 kg (280 lb 12 8 oz)   SpO2 98%   BMI 36 23 kg/m²     Physical Exam   Constitutional: He is oriented to person, place, and time  Vital signs are normal  He appears well-developed and well-nourished     HENT: Head: Normocephalic and atraumatic  Nose: Nose normal    Mouth/Throat: Oropharynx is clear and moist    Eyes: Pupils are equal, round, and reactive to light  Neck: Normal range of motion  Neck supple  No thyromegaly present  Cardiovascular: Normal rate and regular rhythm  No murmur heard  Pulmonary/Chest: Effort normal and breath sounds normal    Abdominal: Soft  Bowel sounds are normal    Musculoskeletal: Normal range of motion  He exhibits no edema or deformity  Neurological: He is alert and oriented to person, place, and time  He has normal reflexes  No cranial nerve deficit  Coordination normal    Skin: Skin is warm  No rash noted  No erythema  Psychiatric: He has a normal mood and affect   His behavior is normal        Donte Mooney MD   [address] [facility]

## 2019-12-06 NOTE — PATIENT INSTRUCTIONS

## 2020-05-11 DIAGNOSIS — Z20.828 EXPOSURE TO SARS-ASSOCIATED CORONAVIRUS: Primary | ICD-10-CM

## 2020-05-13 DIAGNOSIS — Z20.828 EXPOSURE TO SARS-ASSOCIATED CORONAVIRUS: ICD-10-CM

## 2020-05-13 PROCEDURE — U0003 INFECTIOUS AGENT DETECTION BY NUCLEIC ACID (DNA OR RNA); SEVERE ACUTE RESPIRATORY SYNDROME CORONAVIRUS 2 (SARS-COV-2) (CORONAVIRUS DISEASE [COVID-19]), AMPLIFIED PROBE TECHNIQUE, MAKING USE OF HIGH THROUGHPUT TECHNOLOGIES AS DESCRIBED BY CMS-2020-01-R: HCPCS

## 2020-05-15 LAB — SARS-COV-2 RNA SPEC QL NAA+PROBE: NOT DETECTED

## 2020-11-18 ENCOUNTER — APPOINTMENT (OUTPATIENT)
Dept: LAB | Facility: HOSPITAL | Age: 30
End: 2020-11-18
Payer: COMMERCIAL

## 2020-11-18 DIAGNOSIS — E78.2 MIXED HYPERLIPIDEMIA: ICD-10-CM

## 2020-11-18 LAB
ALBUMIN SERPL BCP-MCNC: 4.2 G/DL (ref 3.5–5)
ALP SERPL-CCNC: 86 U/L (ref 46–116)
ALT SERPL W P-5'-P-CCNC: 57 U/L (ref 12–78)
ANION GAP SERPL CALCULATED.3IONS-SCNC: 4 MMOL/L (ref 4–13)
AST SERPL W P-5'-P-CCNC: 21 U/L (ref 5–45)
BILIRUB SERPL-MCNC: 0.69 MG/DL (ref 0.2–1)
BUN SERPL-MCNC: 13 MG/DL (ref 5–25)
CALCIUM SERPL-MCNC: 9.7 MG/DL (ref 8.3–10.1)
CHLORIDE SERPL-SCNC: 109 MMOL/L (ref 100–108)
CHOLEST SERPL-MCNC: 264 MG/DL (ref 50–200)
CO2 SERPL-SCNC: 29 MMOL/L (ref 21–32)
CREAT SERPL-MCNC: 1.04 MG/DL (ref 0.6–1.3)
GFR SERPL CREATININE-BSD FRML MDRD: 96 ML/MIN/1.73SQ M
GLUCOSE P FAST SERPL-MCNC: 75 MG/DL (ref 65–99)
HDLC SERPL-MCNC: 41 MG/DL
LDLC SERPL CALC-MCNC: 190 MG/DL (ref 0–100)
POTASSIUM SERPL-SCNC: 4.2 MMOL/L (ref 3.5–5.3)
PROT SERPL-MCNC: 7.2 G/DL (ref 6.4–8.2)
SODIUM SERPL-SCNC: 142 MMOL/L (ref 136–145)
TRIGL SERPL-MCNC: 167 MG/DL

## 2020-11-18 PROCEDURE — 36415 COLL VENOUS BLD VENIPUNCTURE: CPT | Performed by: FAMILY MEDICINE

## 2020-11-18 PROCEDURE — 80053 COMPREHEN METABOLIC PANEL: CPT | Performed by: FAMILY MEDICINE

## 2020-11-18 PROCEDURE — 80061 LIPID PANEL: CPT

## 2021-03-03 ENCOUNTER — OFFICE VISIT (OUTPATIENT)
Dept: FAMILY MEDICINE CLINIC | Facility: CLINIC | Age: 31
End: 2021-03-03
Payer: COMMERCIAL

## 2021-03-03 VITALS
TEMPERATURE: 97.6 F | SYSTOLIC BLOOD PRESSURE: 130 MMHG | HEART RATE: 77 BPM | HEIGHT: 74 IN | DIASTOLIC BLOOD PRESSURE: 76 MMHG | BODY MASS INDEX: 33.88 KG/M2 | WEIGHT: 264 LBS | OXYGEN SATURATION: 98 %

## 2021-03-03 DIAGNOSIS — E78.2 MIXED HYPERLIPIDEMIA: ICD-10-CM

## 2021-03-03 DIAGNOSIS — Z23 FLU VACCINE NEED: ICD-10-CM

## 2021-03-03 DIAGNOSIS — Z00.00 ANNUAL PHYSICAL EXAM: Primary | ICD-10-CM

## 2021-03-03 PROCEDURE — 99395 PREV VISIT EST AGE 18-39: CPT | Performed by: FAMILY MEDICINE

## 2021-03-03 PROCEDURE — 3008F BODY MASS INDEX DOCD: CPT | Performed by: FAMILY MEDICINE

## 2021-03-03 PROCEDURE — 3725F SCREEN DEPRESSION PERFORMED: CPT | Performed by: FAMILY MEDICINE

## 2021-03-03 PROCEDURE — 90471 IMMUNIZATION ADMIN: CPT

## 2021-03-03 PROCEDURE — 1036F TOBACCO NON-USER: CPT | Performed by: FAMILY MEDICINE

## 2021-03-03 PROCEDURE — 90686 IIV4 VACC NO PRSV 0.5 ML IM: CPT

## 2021-03-03 NOTE — PROGRESS NOTES
[address]    NAME: Guiseppe Scott  AGE: 27 y o  SEX: male  : 1990     DATE: 3/3/2021     Assessment and Plan:     Problem List Items Addressed This Visit     None      Visit Diagnoses     Annual physical exam    -  Primary    Mixed hyperlipidemia        Relevant Orders    Comprehensive metabolic panel    Lipid Panel with Direct LDL reflex    Flu vaccine need        Relevant Orders    influenza vaccine, quadrivalent, 0 5 mL, preservative-free, for adult and pediatric patients 6 mos+ (AFLURIA, FLUARIX, FLULAVAL, FLUZONE)          Immunizations and preventive care screenings were discussed with patient today  Appropriate education was printed on patient's after visit summary  Counseling:  Alcohol/drug use: discussed moderation in alcohol intake, the recommendations for healthy alcohol use, and avoidance of illicit drug use  Dental Health: discussed importance of regular tooth brushing, flossing, and dental visits  Injury prevention: discussed safety/seat belts, safety helmets, smoke detectors, carbon dioxide detectors, and smoking near bedding or upholstery  Sexual health: discussed sexually transmitted diseases, partner selection, use of condoms, avoidance of unintended pregnancy, and contraceptive alternatives  · Exercise: the importance of regular exercise/physical activity was discussed  Recommend exercise 3-5 times per week for at least 30 minutes  BMI Counseling: Body mass index is 33 45 kg/m²  The BMI is above normal  Nutrition recommendations include decreasing portion sizes and encouraging healthy choices of fruits and vegetables  Exercise recommendations include moderate physical activity 150 minutes/week  No pharmacotherapy was ordered  No follow-ups on file       Chief Complaint:     Chief Complaint   Patient presents with    Physical Exam     Patient is here today for an annual exam       History of Present Illness:     Adult Annual Physical   Patient here for a comprehensive physical exam  The patient reports no problems  Lost 20 pounds due to whole 30       Diet and Physical Activity  · Diet/Nutrition: well balanced diet and consuming 3-5 servings of fruits/vegetables daily  · Exercise: walking  Depression Screening  PHQ-9 Depression Screening    PHQ-9:   Frequency of the following problems over the past two weeks:      Little interest or pleasure in doing things: 0 - not at all  Feeling down, depressed, or hopeless: 0 - not at all  PHQ-2 Score: 0       General Health  · Sleep: sleeps well  · Hearing: normal - bilateral   · Vision: no vision problems and most recent eye exam >1 year ago  Lasix surgery   · Dental: regular dental visits and brushes teeth twice daily   Health  · History of STDs?: no      Review of Systems:     Review of Systems   Constitutional: Negative  HENT: Negative  Eyes: Negative  Respiratory: Negative  Cardiovascular: Negative  Gastrointestinal: Negative  Endocrine: Negative  Genitourinary: Negative  Musculoskeletal: Negative  Skin: Negative  Allergic/Immunologic: Negative  Neurological: Negative  Hematological: Negative  Psychiatric/Behavioral: Negative         Past Medical History:     Past Medical History:   Diagnosis Date    Internal derangement of knee     Left - last assessed: Dec 22, 2015      Past Surgical History:     Past Surgical History:   Procedure Laterality Date    KNEE CARTILAGE SURGERY Left     2 surgeries    TRICEPS TENDON RELEASE Left     tricep repair    WISDOM TOOTH EXTRACTION        Social History:        Social History     Socioeconomic History    Marital status: Single     Spouse name: None    Number of children: None    Years of education: None    Highest education level: None   Occupational History    None   Social Needs    Financial resource strain: None    Food insecurity     Worry: None Inability: None    Transportation needs     Medical: None     Non-medical: None   Tobacco Use    Smoking status: Never Smoker    Smokeless tobacco: Never Used   Substance and Sexual Activity    Alcohol use: Yes     Comment: social     Drug use: None    Sexual activity: None   Lifestyle    Physical activity     Days per week: None     Minutes per session: None    Stress: None   Relationships    Social connections     Talks on phone: None     Gets together: None     Attends Denominational service: None     Active member of club or organization: None     Attends meetings of clubs or organizations: None     Relationship status: None    Intimate partner violence     Fear of current or ex partner: None     Emotionally abused: None     Physically abused: None     Forced sexual activity: None   Other Topics Concern    None   Social History Narrative    None      Family History:     Family History   Problem Relation Age of Onset    Hodgkin's lymphoma Mother     Heart defect Mother     No Known Problems Father       Current Medications:     Current Outpatient Medications   Medication Sig Dispense Refill    ondansetron (ZOFRAN-ODT) 4 mg disintegrating tablet Take 1 tablet (4 mg total) by mouth every 6 (six) hours as needed for nausea or vomiting 20 tablet 0     No current facility-administered medications for this visit  Allergies:     No Known Allergies   Physical Exam:     /76 (BP Location: Left arm, Patient Position: Sitting, Cuff Size: Large)   Pulse 77   Temp 97 6 °F (36 4 °C) (Tympanic)   Ht 6' 2 49" (1 892 m)   Wt 120 kg (264 lb)   SpO2 98%   BMI 33 45 kg/m²     Physical Exam  Vitals signs and nursing note reviewed  Constitutional:       Appearance: He is well-developed  HENT:      Head: Normocephalic and atraumatic  Nose: Nose normal  No congestion or rhinorrhea        Mouth/Throat:      Mouth: Mucous membranes are moist    Eyes:      Conjunctiva/sclera: Conjunctivae normal    Neck: Musculoskeletal: Neck supple  Cardiovascular:      Rate and Rhythm: Normal rate and regular rhythm  Heart sounds: No murmur  Pulmonary:      Effort: Pulmonary effort is normal  No respiratory distress  Breath sounds: Normal breath sounds  Abdominal:      Palpations: Abdomen is soft  Tenderness: There is no abdominal tenderness  Musculoskeletal: Normal range of motion  Skin:     General: Skin is warm and dry  Neurological:      General: No focal deficit present  Mental Status: He is alert and oriented to person, place, and time     Psychiatric:         Mood and Affect: Mood normal          Behavior: Behavior normal           Jarrod Diaz MD   [address] [facility]

## 2021-03-03 NOTE — PATIENT INSTRUCTIONS

## 2021-03-12 ENCOUNTER — IMMUNIZATIONS (OUTPATIENT)
Dept: FAMILY MEDICINE CLINIC | Facility: HOSPITAL | Age: 31
End: 2021-03-12

## 2021-03-12 DIAGNOSIS — Z23 ENCOUNTER FOR IMMUNIZATION: Primary | ICD-10-CM

## 2021-03-12 PROCEDURE — 91301 SARS-COV-2 / COVID-19 MRNA VACCINE (MODERNA) 100 MCG: CPT

## 2021-03-12 PROCEDURE — 0011A SARS-COV-2 / COVID-19 MRNA VACCINE (MODERNA) 100 MCG: CPT

## 2021-04-09 ENCOUNTER — IMMUNIZATIONS (OUTPATIENT)
Dept: FAMILY MEDICINE CLINIC | Facility: HOSPITAL | Age: 31
End: 2021-04-09

## 2021-04-09 DIAGNOSIS — Z23 ENCOUNTER FOR IMMUNIZATION: Primary | ICD-10-CM

## 2021-04-09 PROCEDURE — 0012A SARS-COV-2 / COVID-19 MRNA VACCINE (MODERNA) 100 MCG: CPT

## 2021-04-09 PROCEDURE — 91301 SARS-COV-2 / COVID-19 MRNA VACCINE (MODERNA) 100 MCG: CPT

## 2021-12-14 ENCOUNTER — IMMUNIZATIONS (OUTPATIENT)
Dept: FAMILY MEDICINE CLINIC | Facility: HOSPITAL | Age: 31
End: 2021-12-14

## 2021-12-14 DIAGNOSIS — Z23 ENCOUNTER FOR IMMUNIZATION: Primary | ICD-10-CM

## 2021-12-14 PROCEDURE — 0064A COVID-19 MODERNA VACC 0.25 ML BOOSTER: CPT

## 2021-12-14 PROCEDURE — 91306 COVID-19 MODERNA VACC 0.25 ML BOOSTER: CPT

## 2022-03-03 ENCOUNTER — RA CDI HCC (OUTPATIENT)
Dept: OTHER | Facility: HOSPITAL | Age: 32
End: 2022-03-03

## 2022-03-03 NOTE — PROGRESS NOTES
[facility]  coding opportunities       Chart reviewed, no opportunity found: CHART REVIEWED, NO OPPORTUNITY FOUND                        Patients insurance company: [employer] (Medicare Advantage and Commercial)

## 2022-03-09 ENCOUNTER — OFFICE VISIT (OUTPATIENT)
Dept: FAMILY MEDICINE CLINIC | Facility: CLINIC | Age: 32
End: 2022-03-09
Payer: COMMERCIAL

## 2022-03-09 VITALS
TEMPERATURE: 97.2 F | HEIGHT: 74 IN | BODY MASS INDEX: 35.6 KG/M2 | OXYGEN SATURATION: 98 % | DIASTOLIC BLOOD PRESSURE: 78 MMHG | WEIGHT: 277.4 LBS | RESPIRATION RATE: 16 BRPM | SYSTOLIC BLOOD PRESSURE: 122 MMHG | HEART RATE: 79 BPM

## 2022-03-09 DIAGNOSIS — B35.1 ONYCHOMYCOSIS: ICD-10-CM

## 2022-03-09 DIAGNOSIS — Z23 ENCOUNTER FOR IMMUNIZATION: ICD-10-CM

## 2022-03-09 DIAGNOSIS — G43.909 MIGRAINE WITHOUT STATUS MIGRAINOSUS, NOT INTRACTABLE, UNSPECIFIED MIGRAINE TYPE: ICD-10-CM

## 2022-03-09 DIAGNOSIS — Z00.00 ANNUAL PHYSICAL EXAM: Primary | ICD-10-CM

## 2022-03-09 PROCEDURE — 90471 IMMUNIZATION ADMIN: CPT

## 2022-03-09 PROCEDURE — 99395 PREV VISIT EST AGE 18-39: CPT | Performed by: FAMILY MEDICINE

## 2022-03-09 PROCEDURE — 90686 IIV4 VACC NO PRSV 0.5 ML IM: CPT

## 2022-03-09 RX ORDER — TERBINAFINE HYDROCHLORIDE 250 MG/1
250 TABLET ORAL DAILY
Qty: 90 TABLET | Refills: 0 | Status: SHIPPED | OUTPATIENT
Start: 2022-03-09 | End: 2022-06-07

## 2022-03-09 RX ORDER — ONDANSETRON 4 MG/1
4 TABLET, ORALLY DISINTEGRATING ORAL EVERY 6 HOURS PRN
Qty: 20 TABLET | Refills: 0 | Status: SHIPPED | OUTPATIENT
Start: 2022-03-09

## 2022-03-09 NOTE — PROGRESS NOTES
[address]    NAME: Dale Pritchard  AGE: 32 y o  SEX: male  : 1990     DATE: 3/9/2022     Assessment and Plan:     Problem List Items Addressed This Visit     None      Visit Diagnoses     Annual physical exam    -  Primary    Relevant Orders    CBC and differential    Comprehensive metabolic panel    Lipid Panel with Direct LDL reflex    Hemoglobin A1C    Encounter for immunization        Relevant Orders    influenza vaccine, quadrivalent, 0 5 mL, preservative-free, for adult and pediatric patients 6 mos+ (AFLURIA, FLUARIX, FLULAVAL, FLUZONE) (Completed)    Migraine without status migrainosus, not intractable, unspecified migraine type        Relevant Medications    ondansetron (ZOFRAN-ODT) 4 mg disintegrating tablet    Onychomycosis        Relevant Medications    terbinafine (LamISIL) 250 mg tablet          Immunizations and preventive care screenings were discussed with patient today  Appropriate education was printed on patient's after visit summary  Counseling:  Alcohol/drug use: discussed moderation in alcohol intake, the recommendations for healthy alcohol use, and avoidance of illicit drug use  Dental Health: discussed importance of regular tooth brushing, flossing, and dental visits  Injury prevention: discussed safety/seat belts, safety helmets, smoke detectors, carbon dioxide detectors, and smoking near bedding or upholstery  Sexual health: discussed sexually transmitted diseases, partner selection, use of condoms, avoidance of unintended pregnancy, and contraceptive alternatives  · Exercise: the importance of regular exercise/physical activity was discussed  Recommend exercise 3-5 times per week for at least 30 minutes  BMI Counseling: Body mass index is 35 34 kg/m²  The BMI is above normal  Nutrition recommendations include decreasing portion sizes and encouraging healthy choices of fruits and vegetables  Exercise recommendations include moderate physical activity 150 minutes/week  No pharmacotherapy was ordered  Rationale for BMI follow-up plan is due to patient being overweight or obese  Depression Screening and Follow-up Plan: Patient was screened for depression during today's encounter  They screened negative with a PHQ-2 score of 0  No follow-ups on file  Chief Complaint:     Chief Complaint   Patient presents with    Physical Exam     Patient is here today for an annual exam       History of Present Illness:     Adult Annual Physical   Patient here for a comprehensive physical exam  The patient reports no problems  Diet and Physical Activity  · Diet/Nutrition: well balanced diet and consuming 3-5 servings of fruits/vegetables daily  · Exercise: walking  Depression Screening  PHQ-2/9 Depression Screening    Little interest or pleasure in doing things: 0 - not at all  Feeling down, depressed, or hopeless: 0 - not at all  PHQ-2 Score: 0  PHQ-2 Interpretation: Negative depression screen       General Health  · Sleep: sleeps well and gets 7-8 hours of sleep on average  · Hearing: normal - bilateral   · Vision: goes for regular eye exams  · Dental: regular dental visits and brushes teeth twice daily   Health  · History of STDs?: no      Review of Systems:     Review of Systems   Constitutional: Negative  HENT: Negative  Eyes: Negative  Respiratory: Negative  Cardiovascular: Negative  Gastrointestinal: Negative  Endocrine: Negative  Genitourinary: Negative  Musculoskeletal: Negative  Skin: Negative  Allergic/Immunologic: Negative  Neurological: Negative  Hematological: Negative  Psychiatric/Behavioral: Negative         Past Medical History:     Past Medical History:   Diagnosis Date    Internal derangement of knee     Left - last assessed: Dec 22, 2015      Past Surgical History:     Past Surgical History:   Procedure Laterality Date    KNEE CARTILAGE SURGERY Left     2 surgeries    TRICEPS TENDON RELEASE Left     tricep repair    WISDOM TOOTH EXTRACTION        Social History:     Social History     Socioeconomic History    Marital status: Single     Spouse name: None    Number of children: None    Years of education: None    Highest education level: None   Occupational History    None   Tobacco Use    Smoking status: Never Smoker    Smokeless tobacco: Never Used   Substance and Sexual Activity    Alcohol use: Yes     Comment: social     Drug use: None    Sexual activity: None   Other Topics Concern    None   Social History Narrative    None     Social Determinants of Health     Financial Resource Strain: Not on file   Food Insecurity: Not on file   Transportation Needs: Not on file   Physical Activity: Not on file   Stress: Not on file   Social Connections: Not on file   Intimate Partner Violence: Not on file   Housing Stability: Not on file      Family History:     Family History   Problem Relation Age of Onset    Hodgkin's lymphoma Mother     Heart defect Mother     No Known Problems Father       Current Medications:     Current Outpatient Medications   Medication Sig Dispense Refill    ondansetron (ZOFRAN-ODT) 4 mg disintegrating tablet Take 1 tablet (4 mg total) by mouth every 6 (six) hours as needed for nausea or vomiting 20 tablet 0    terbinafine (LamISIL) 250 mg tablet Take 1 tablet (250 mg total) by mouth daily 90 tablet 0     No current facility-administered medications for this visit  Allergies:     No Known Allergies   Physical Exam:     /78 (BP Location: Left arm, Patient Position: Sitting, Cuff Size: Adult)   Pulse 79   Temp (!) 97 2 °F (36 2 °C) (Skin)   Resp 16   Ht 6' 2 29" (1 887 m)   Wt 126 kg (277 lb 6 4 oz)   SpO2 98%   BMI 35 34 kg/m²     Physical Exam  Constitutional:       Appearance: Normal appearance  He is well-developed  HENT:      Head: Normocephalic and atraumatic        Right Ear: Tympanic membrane normal       Left Ear: Tympanic membrane normal       Nose: Nose normal  No rhinorrhea  Mouth/Throat:      Mouth: Mucous membranes are moist    Eyes:      Pupils: Pupils are equal, round, and reactive to light  Cardiovascular:      Rate and Rhythm: Normal rate and regular rhythm  Pulses: Normal pulses  Heart sounds: Normal heart sounds  Pulmonary:      Effort: Pulmonary effort is normal       Breath sounds: Normal breath sounds  Abdominal:      General: Abdomen is flat  Palpations: Abdomen is soft  Musculoskeletal:         General: Normal range of motion  Cervical back: Normal range of motion and neck supple  Comments: Yellow discoloration toe nails    Skin:     General: Skin is warm  Capillary Refill: Capillary refill takes less than 2 seconds  Neurological:      General: No focal deficit present  Mental Status: He is alert and oriented to person, place, and time     Psychiatric:         Mood and Affect: Mood normal          Behavior: Behavior normal           Kera Hampton MD   [address] [facility]

## 2022-03-09 NOTE — PATIENT INSTRUCTIONS

## 2022-03-16 ENCOUNTER — APPOINTMENT (OUTPATIENT)
Dept: LAB | Facility: HOSPITAL | Age: 32
End: 2022-03-16

## 2022-03-16 ENCOUNTER — APPOINTMENT (OUTPATIENT)
Dept: LAB | Facility: HOSPITAL | Age: 32
End: 2022-03-16
Payer: COMMERCIAL

## 2022-03-16 DIAGNOSIS — Z00.00 ANNUAL PHYSICAL EXAM: ICD-10-CM

## 2022-03-16 DIAGNOSIS — Z00.8 ENCOUNTER FOR OTHER GENERAL EXAMINATION: ICD-10-CM

## 2022-03-16 LAB
ALBUMIN SERPL BCP-MCNC: 4 G/DL (ref 3.5–5)
ALP SERPL-CCNC: 93 U/L (ref 46–116)
ALT SERPL W P-5'-P-CCNC: 100 U/L (ref 12–78)
ANION GAP SERPL CALCULATED.3IONS-SCNC: 4 MMOL/L (ref 4–13)
AST SERPL W P-5'-P-CCNC: 34 U/L (ref 5–45)
BASOPHILS # BLD AUTO: 0.04 THOUSANDS/ΜL (ref 0–0.1)
BASOPHILS NFR BLD AUTO: 1 % (ref 0–1)
BILIRUB SERPL-MCNC: 0.74 MG/DL (ref 0.2–1)
BUN SERPL-MCNC: 13 MG/DL (ref 5–25)
CALCIUM SERPL-MCNC: 9.4 MG/DL (ref 8.3–10.1)
CHLORIDE SERPL-SCNC: 109 MMOL/L (ref 100–108)
CHOLEST SERPL-MCNC: 276 MG/DL
CO2 SERPL-SCNC: 25 MMOL/L (ref 21–32)
CREAT SERPL-MCNC: 1 MG/DL (ref 0.6–1.3)
EOSINOPHIL # BLD AUTO: 0.13 THOUSAND/ΜL (ref 0–0.61)
EOSINOPHIL NFR BLD AUTO: 2 % (ref 0–6)
ERYTHROCYTE [DISTWIDTH] IN BLOOD BY AUTOMATED COUNT: 12.7 % (ref 11.6–15.1)
EST. AVERAGE GLUCOSE BLD GHB EST-MCNC: 100 MG/DL
GFR SERPL CREATININE-BSD FRML MDRD: 99 ML/MIN/1.73SQ M
GLUCOSE P FAST SERPL-MCNC: 92 MG/DL (ref 65–99)
HBA1C MFR BLD: 5.1 %
HCT VFR BLD AUTO: 45.3 % (ref 36.5–49.3)
HDLC SERPL-MCNC: 32 MG/DL
HGB BLD-MCNC: 16.5 G/DL (ref 12–17)
IMM GRANULOCYTES # BLD AUTO: 0.03 THOUSAND/UL (ref 0–0.2)
IMM GRANULOCYTES NFR BLD AUTO: 0 % (ref 0–2)
LDLC SERPL CALC-MCNC: 189 MG/DL (ref 0–100)
LYMPHOCYTES # BLD AUTO: 2.97 THOUSANDS/ΜL (ref 0.6–4.47)
LYMPHOCYTES NFR BLD AUTO: 44 % (ref 14–44)
MCH RBC QN AUTO: 30.6 PG (ref 26.8–34.3)
MCHC RBC AUTO-ENTMCNC: 36.4 G/DL (ref 31.4–37.4)
MCV RBC AUTO: 84 FL (ref 82–98)
MONOCYTES # BLD AUTO: 0.43 THOUSAND/ΜL (ref 0.17–1.22)
MONOCYTES NFR BLD AUTO: 6 % (ref 4–12)
NEUTROPHILS # BLD AUTO: 3.18 THOUSANDS/ΜL (ref 1.85–7.62)
NEUTS SEG NFR BLD AUTO: 47 % (ref 43–75)
NRBC BLD AUTO-RTO: 0 /100 WBCS
PLATELET # BLD AUTO: 222 THOUSANDS/UL (ref 149–390)
PMV BLD AUTO: 9.6 FL (ref 8.9–12.7)
POTASSIUM SERPL-SCNC: 4.1 MMOL/L (ref 3.5–5.3)
PROT SERPL-MCNC: 7.2 G/DL (ref 6.4–8.2)
RBC # BLD AUTO: 5.4 MILLION/UL (ref 3.88–5.62)
SODIUM SERPL-SCNC: 138 MMOL/L (ref 136–145)
TRIGL SERPL-MCNC: 274 MG/DL
WBC # BLD AUTO: 6.78 THOUSAND/UL (ref 4.31–10.16)

## 2022-03-16 PROCEDURE — 83036 HEMOGLOBIN GLYCOSYLATED A1C: CPT | Performed by: FAMILY MEDICINE

## 2022-03-16 PROCEDURE — 80061 LIPID PANEL: CPT

## 2022-03-16 PROCEDURE — 80053 COMPREHEN METABOLIC PANEL: CPT | Performed by: FAMILY MEDICINE

## 2022-03-16 PROCEDURE — 85025 COMPLETE CBC W/AUTO DIFF WBC: CPT | Performed by: FAMILY MEDICINE

## 2022-03-16 PROCEDURE — 36415 COLL VENOUS BLD VENIPUNCTURE: CPT | Performed by: FAMILY MEDICINE

## 2022-03-25 ENCOUNTER — HOSPITAL ENCOUNTER (OUTPATIENT)
Dept: RADIOLOGY | Facility: HOSPITAL | Age: 32
Discharge: HOME | End: 2022-03-25
Payer: COMMERCIAL

## 2022-03-25 DIAGNOSIS — R74.8 ELEVATED LIVER ENZYMES: ICD-10-CM

## 2022-03-25 PROCEDURE — 76700 US EXAM ABDOM COMPLETE: CPT

## 2022-04-14 ENCOUNTER — APPOINTMENT (OUTPATIENT)
Dept: LAB | Facility: HOSPITAL | Age: 32
End: 2022-04-14
Payer: COMMERCIAL

## 2022-04-28 ENCOUNTER — APPOINTMENT (OUTPATIENT)
Dept: LAB | Facility: HOSPITAL | Age: 32
End: 2022-04-28
Payer: COMMERCIAL

## 2022-04-28 DIAGNOSIS — R74.8 ELEVATED LIVER ENZYMES: ICD-10-CM

## 2022-04-28 LAB
HBV CORE AB SER QL: NORMAL
HBV CORE IGM SER QL: NORMAL
HBV SURFACE AG SER QL: NORMAL
HCV AB SER QL: NORMAL

## 2022-04-28 PROCEDURE — 86376 MICROSOMAL ANTIBODY EACH: CPT

## 2022-04-28 PROCEDURE — 86015 ACTIN ANTIBODY EACH: CPT

## 2022-04-28 PROCEDURE — 86704 HEP B CORE ANTIBODY TOTAL: CPT

## 2022-04-28 PROCEDURE — 36415 COLL VENOUS BLD VENIPUNCTURE: CPT

## 2022-04-28 PROCEDURE — 80074 ACUTE HEPATITIS PANEL: CPT

## 2022-04-28 PROCEDURE — 82103 ALPHA-1-ANTITRYPSIN TOTAL: CPT

## 2022-04-28 PROCEDURE — 82390 ASSAY OF CERULOPLASMIN: CPT

## 2022-04-29 LAB
A1AT SERPL-MCNC: 126 MG/DL (ref 95–164)
ACTIN IGG SERPL-ACNC: 13 UNITS (ref 0–19)
CERULOPLASMIN SERPL-MCNC: 19 MG/DL (ref 16–31)
HAV IGM SER QL: NORMAL
HBV CORE IGM SER QL: NORMAL
HBV SURFACE AG SER QL: NORMAL
HCV AB SER QL: NORMAL
THYROPEROXIDASE AB SERPL-ACNC: <8 IU/ML (ref 0–34)

## 2022-08-02 ENCOUNTER — TELEMEDICINE (OUTPATIENT)
Dept: FAMILY MEDICINE CLINIC | Facility: CLINIC | Age: 32
End: 2022-08-02
Payer: COMMERCIAL

## 2022-08-02 VITALS — BODY MASS INDEX: 34.65 KG/M2 | HEIGHT: 74 IN | WEIGHT: 270 LBS

## 2022-08-02 DIAGNOSIS — B34.9 VIRAL ILLNESS: Primary | ICD-10-CM

## 2022-08-02 PROCEDURE — U0003 INFECTIOUS AGENT DETECTION BY NUCLEIC ACID (DNA OR RNA); SEVERE ACUTE RESPIRATORY SYNDROME CORONAVIRUS 2 (SARS-COV-2) (CORONAVIRUS DISEASE [COVID-19]), AMPLIFIED PROBE TECHNIQUE, MAKING USE OF HIGH THROUGHPUT TECHNOLOGIES AS DESCRIBED BY CMS-2020-01-R: HCPCS | Performed by: FAMILY MEDICINE

## 2022-08-02 PROCEDURE — U0005 INFEC AGEN DETEC AMPLI PROBE: HCPCS | Performed by: FAMILY MEDICINE

## 2022-08-02 PROCEDURE — 99213 OFFICE O/P EST LOW 20 MIN: CPT | Performed by: FAMILY MEDICINE

## 2022-08-02 RX ORDER — BROMPHENIRAMINE MALEATE, PSEUDOEPHEDRINE HYDROCHLORIDE, AND DEXTROMETHORPHAN HYDROBROMIDE 2; 30; 10 MG/5ML; MG/5ML; MG/5ML
5 SYRUP ORAL 4 TIMES DAILY PRN
Qty: 200 ML | Refills: 0 | Status: SHIPPED | OUTPATIENT
Start: 2022-08-02

## 2022-08-02 NOTE — PROGRESS NOTES
COVID-19 Outpatient Progress Note    Assessment/Plan:    Problem List Items Addressed This Visit    None     Visit Diagnoses     Viral illness    -  Primary    symptoms started today  negative home covid test  will get PCR today  hydration and rest     Relevant Medications    brompheniramine-pseudoephedrine-DM 30-2-10 MG/5ML syrup    Other Relevant Orders    COVID Only- Office Collect         Disposition:     Recommended patient to come to the office to test for COVID-19  I have spent 15 minutes directly with the patient  Encounter provider Jenniffer Serna MD    Provider located at [address] [id number]    Recent Visits  No visits were found meeting these conditions  Showing recent visits within past 7 days and meeting all other requirements  Today's Visits  Date Type Provider Dept   08/02/22 Telemedicine Jenniffer Serna MD [facility] today's visits and meeting all other requirements  Future Appointments  No visits were found meeting these conditions  Showing future appointments within next 150 days and meeting all other requirements     This virtual check-in was done via [employer] and patient was informed that this is a secure, HIPAA-compliant platform  He agrees to proceed  Patient agrees to participate in a virtual check in via telephone or video visit instead of presenting to the office to address urgent/immediate medical needs  Patient is aware this is a billable service  After connecting through [facility], the patient was identified by name and date of birth  Matt Gannon was informed that this was a telemedicine visit and that the exam was being conducted confidentially over secure lines  My office door was closed  No one else was in the room  Matt Gannon acknowledged consent and understanding of privacy and security of the telemedicine visit   I informed the patient that I have reviewed his record in Epic and presented the opportunity for him to ask any questions regarding the visit today  The patient agreed to participate  Verification of patient location:  Patient is located in the following state in which I hold an active license: [place]    Subjective:   Bing Neri is a 28 y o  male who is concerned about COVID-19  Patient's symptoms include cough and headache  Patient denies fever, chills, fatigue, malaise, congestion, rhinorrhea, sore throat, anosmia, loss of taste, shortness of breath, chest tightness, abdominal pain, nausea, vomiting, diarrhea and myalgias       - Date of symptom onset: 8/2/2022      COVID-19 vaccination status: Fully vaccinated with booster    Exposure:   Contact with a person who is under investigation (PUI) for or who is positive for COVID-19 within the last 14 days?: No    Hospitalized recently for fever and/or lower respiratory symptoms?: No      Currently a healthcare worker that is involved in direct patient care?: No      Works in a special setting where the risk of COVID-19 transmission may be high? (this may include long-term care, correctional and [facility] facilities; homeless shelters; assisted-living facilities and group homes ): No      Resident in a special setting where the risk of COVID-19 transmission may be high? (this may include long-term care, correctional and [facility] facilities; homeless shelters; assisted-living facilities and group homes ): No      Lab Results   [facility]    [facility] Not Detected 05/13/2020     Past Medical History:   Diagnosis Date    Internal derangement of knee     Left - last assessed: Dec 22, 2015     Past Surgical History:   Procedure Laterality Date    KNEE CARTILAGE SURGERY Left     2 surgeries    TRICEPS TENDON RELEASE Left     tricep repair    WISDOM TOOTH EXTRACTION       Current Outpatient Medications   Medication Sig Dispense Refill    brompheniramine-pseudoephedrine-DM 30-2-10 MG/5ML syrup Take 5 mL by mouth 4 (four) times a day as needed for cough 200 mL 0    ondansetron (ZOFRAN-ODT) 4 mg disintegrating tablet Take 1 tablet (4 mg total) by mouth every 6 (six) hours as needed for nausea or vomiting 20 tablet 0     No current facility-administered medications for this visit  No Known Allergies    Review of Systems   Constitutional: Negative for chills, fatigue and fever  HENT: Negative for congestion, rhinorrhea and sore throat  Respiratory: Positive for cough  Negative for chest tightness and shortness of breath  Gastrointestinal: Negative for abdominal pain, diarrhea, nausea and vomiting  Musculoskeletal: Negative for myalgias  Neurological: Positive for headaches  Objective:    Vitals:    08/02/22 1128   Weight: 122 kg (270 lb)   Height: 6' 2 29" (1 887 m)       Physical Exam  Constitutional:       Appearance: Normal appearance  Pulmonary:      Effort: Pulmonary effort is normal  No respiratory distress  Neurological:      General: No focal deficit present  Mental Status: He is alert and oriented to person, place, and time  Psychiatric:         Mood and Affect: Mood normal          Behavior: Behavior normal          VIRTUAL VISIT DISCLAIMER    Phillip Peoples verbally agrees to participate in [employer]  Pt is aware that [employer] could be limited without vital signs or the ability to perform a full hands-on physical exam  Odin Dexter understands he or the provider may request at any time to terminate the video visit and request the patient to seek care or treatment in person

## 2022-08-03 DIAGNOSIS — U07.1 COVID-19: Primary | ICD-10-CM

## 2022-08-03 LAB — SARS-COV-2 RNA RESP QL NAA+PROBE: POSITIVE

## 2023-02-11 ENCOUNTER — HOSPITAL ENCOUNTER (OUTPATIENT)
Facility: HOSPITAL | Age: 33
Setting detail: OBSERVATION
Discharge: HOME | End: 2023-02-12
Attending: EMERGENCY MEDICINE | Admitting: INTERNAL MEDICINE

## 2023-02-11 ENCOUNTER — APPOINTMENT (EMERGENCY)
Dept: RADIOLOGY | Facility: HOSPITAL | Age: 33
End: 2023-02-11

## 2023-02-11 DIAGNOSIS — E86.0 DEHYDRATION: ICD-10-CM

## 2023-02-11 DIAGNOSIS — K52.9 ACUTE GASTROENTERITIS: Primary | ICD-10-CM

## 2023-02-11 DIAGNOSIS — R19.7 DIARRHEA: ICD-10-CM

## 2023-02-11 DIAGNOSIS — R11.2 NAUSEA AND VOMITING, UNSPECIFIED VOMITING TYPE: ICD-10-CM

## 2023-02-11 PROBLEM — R55 SYNCOPE: Status: ACTIVE | Noted: 2023-02-11

## 2023-02-11 LAB
ALBUMIN SERPL BCP-MCNC: 4.3 G/DL (ref 3.5–5)
ALP SERPL-CCNC: 91 U/L (ref 46–116)
ALT SERPL W P-5'-P-CCNC: 99 U/L (ref 12–78)
ANION GAP SERPL CALCULATED.3IONS-SCNC: 3 MMOL/L (ref 4–13)
AST SERPL W P-5'-P-CCNC: 31 U/L (ref 5–45)
ATRIAL RATE: 105 BPM
ATRIAL RATE: 114 BPM
BACTERIA UR QL AUTO: ABNORMAL /HPF
BASOPHILS # BLD MANUAL: 0.09 THOUSAND/UL (ref 0–0.1)
BASOPHILS NFR MAR MANUAL: 1 % (ref 0–1)
BILIRUB SERPL-MCNC: 1.39 MG/DL (ref 0.2–1)
BILIRUB UR QL STRIP: NEGATIVE
BUN SERPL-MCNC: 16 MG/DL (ref 5–25)
CALCIUM SERPL-MCNC: 9.4 MG/DL (ref 8.3–10.1)
CARDIAC TROPONIN I PNL SERPL HS: 3 NG/L
CARDIAC TROPONIN I PNL SERPL HS: 3 NG/L (ref 8–18)
CHLORIDE SERPL-SCNC: 107 MMOL/L (ref 96–108)
CLARITY UR: CLEAR
CO2 SERPL-SCNC: 27 MMOL/L (ref 21–32)
COLOR UR: YELLOW
CREAT SERPL-MCNC: 1.07 MG/DL (ref 0.6–1.3)
EOSINOPHIL # BLD MANUAL: 0 THOUSAND/UL (ref 0–0.4)
EOSINOPHIL NFR BLD MANUAL: 0 % (ref 0–6)
ERYTHROCYTE [DISTWIDTH] IN BLOOD BY AUTOMATED COUNT: 12.5 % (ref 11.6–15.1)
GFR SERPL CREATININE-BSD FRML MDRD: 91 ML/MIN/1.73SQ M
GLUCOSE SERPL-MCNC: 101 MG/DL (ref 65–140)
GLUCOSE UR STRIP-MCNC: NEGATIVE MG/DL
HCT VFR BLD AUTO: 49.3 % (ref 36.5–49.3)
HGB BLD-MCNC: 16.9 G/DL (ref 12–17)
HGB UR QL STRIP.AUTO: NEGATIVE
KETONES UR STRIP-MCNC: NEGATIVE MG/DL
LACTATE SERPL-SCNC: 1.4 MMOL/L (ref 0.5–2)
LEUKOCYTE ESTERASE UR QL STRIP: NEGATIVE
LIPASE SERPL-CCNC: 109 U/L (ref 73–393)
LYMPHOCYTES # BLD AUTO: 0.54 THOUSAND/UL (ref 0.6–4.47)
LYMPHOCYTES # BLD AUTO: 6 % (ref 14–44)
MAGNESIUM SERPL-MCNC: 1.9 MG/DL (ref 1.6–2.6)
MCH RBC QN AUTO: 30.3 PG (ref 26.8–34.3)
MCHC RBC AUTO-ENTMCNC: 34.3 G/DL (ref 31.4–37.4)
MCV RBC AUTO: 88 FL (ref 82–98)
MONOCYTES # BLD AUTO: 0.36 THOUSAND/UL (ref 0–1.22)
MONOCYTES NFR BLD: 4 % (ref 4–12)
MUCOUS THREADS UR QL AUTO: ABNORMAL
NEUTROPHILS # BLD MANUAL: 8.02 THOUSAND/UL (ref 1.85–7.62)
NEUTS BAND NFR BLD MANUAL: 17 % (ref 0–8)
NEUTS SEG NFR BLD AUTO: 72 % (ref 43–75)
NITRITE UR QL STRIP: NEGATIVE
NON-SQ EPI CELLS URNS QL MICRO: ABNORMAL /HPF
P AXIS: 56 DEGREES
P AXIS: 57 DEGREES
PH UR STRIP.AUTO: 6.5 [PH]
PLATELET # BLD AUTO: 197 THOUSANDS/UL (ref 149–390)
PLATELET BLD QL SMEAR: ADEQUATE
PMV BLD AUTO: 9.6 FL (ref 8.9–12.7)
POTASSIUM SERPL-SCNC: 4 MMOL/L (ref 3.5–5.3)
PR INTERVAL: 140 MS
PR INTERVAL: 144 MS
PROT SERPL-MCNC: 7.2 G/DL (ref 6.4–8.4)
PROT UR STRIP-MCNC: ABNORMAL MG/DL
QRS AXIS: 38 DEGREES
QRS AXIS: 42 DEGREES
QRSD INTERVAL: 84 MS
QRSD INTERVAL: 86 MS
QT INTERVAL: 302 MS
QT INTERVAL: 306 MS
QTC INTERVAL: 404 MS
QTC INTERVAL: 416 MS
RBC # BLD AUTO: 5.58 MILLION/UL (ref 3.88–5.62)
RBC #/AREA URNS AUTO: ABNORMAL /HPF
RBC MORPH BLD: NORMAL
SODIUM SERPL-SCNC: 137 MMOL/L (ref 135–147)
SP GR UR STRIP.AUTO: 1.02 (ref 1–1.03)
T WAVE AXIS: 26 DEGREES
T WAVE AXIS: 35 DEGREES
UROBILINOGEN UR STRIP-ACNC: <2 MG/DL
VENTRICULAR RATE: 105 BPM
VENTRICULAR RATE: 114 BPM
WBC # BLD AUTO: 9.01 THOUSAND/UL (ref 4.31–10.16)
WBC #/AREA URNS AUTO: ABNORMAL /HPF

## 2023-02-11 RX ORDER — ACETAMINOPHEN 325 MG/1
650 TABLET ORAL ONCE
Status: COMPLETED | OUTPATIENT
Start: 2023-02-11 | End: 2023-02-11

## 2023-02-11 RX ORDER — SODIUM CHLORIDE 9 MG/ML
75 INJECTION, SOLUTION INTRAVENOUS CONTINUOUS
Status: DISCONTINUED | OUTPATIENT
Start: 2023-02-11 | End: 2023-02-12

## 2023-02-11 RX ORDER — ONDANSETRON 4 MG/1
4 TABLET, ORALLY DISINTEGRATING ORAL ONCE
Status: DISCONTINUED | OUTPATIENT
Start: 2023-02-11 | End: 2023-02-11

## 2023-02-11 RX ORDER — SODIUM CHLORIDE, SODIUM GLUCONATE, SODIUM ACETATE, POTASSIUM CHLORIDE, MAGNESIUM CHLORIDE, SODIUM PHOSPHATE, DIBASIC, AND POTASSIUM PHOSPHATE .53; .5; .37; .037; .03; .012; .00082 G/100ML; G/100ML; G/100ML; G/100ML; G/100ML; G/100ML; G/100ML
1000 INJECTION, SOLUTION INTRAVENOUS ONCE
Status: COMPLETED | OUTPATIENT
Start: 2023-02-11 | End: 2023-02-11

## 2023-02-11 RX ORDER — ONDANSETRON 2 MG/ML
4 INJECTION INTRAMUSCULAR; INTRAVENOUS ONCE
Status: DISCONTINUED | OUTPATIENT
Start: 2023-02-11 | End: 2023-02-11

## 2023-02-11 RX ADMIN — SODIUM CHLORIDE 75 ML/HR: 0.9 INJECTION, SOLUTION INTRAVENOUS at 21:34

## 2023-02-11 RX ADMIN — IOHEXOL 90 ML: 350 INJECTION, SOLUTION INTRAVENOUS at 21:29

## 2023-02-11 RX ADMIN — SODIUM CHLORIDE, SODIUM GLUCONATE, SODIUM ACETATE, POTASSIUM CHLORIDE, MAGNESIUM CHLORIDE, SODIUM PHOSPHATE, DIBASIC, AND POTASSIUM PHOSPHATE 1000 ML: .53; .5; .37; .037; .03; .012; .00082 INJECTION, SOLUTION INTRAVENOUS at 18:34

## 2023-02-11 RX ADMIN — SODIUM CHLORIDE 1000 ML: 0.9 INJECTION, SOLUTION INTRAVENOUS at 17:03

## 2023-02-11 RX ADMIN — ACETAMINOPHEN 650 MG: 325 TABLET, FILM COATED ORAL at 18:00

## 2023-02-11 NOTE — ED PROVIDER NOTES
History  Chief Complaint   Patient presents with   • Vomiting     Has vomited multiple times today w/ several cases of diarrhea in the morning  Took tylenol, tums and gasx PTA  Very brief syncopal episode around 130      55-year-old male with no significant prior medical history presents with nausea, vomiting, and diarrhea since this morning  Patient reports several episodes of large-volume watery vomiting this morning and loose stools  Patient felt better after vomiting  Shortly prior to arrival patient was at a bar when he experienced rebound nausea and vomited watery vomit  Patient's friends were with in the bar who note that patient was nodding off at the bar  Denies chest pain, abdominal pain, back pain, fevers, history of abdominal surgeries, history of diabetes, recent travel, or recent antibiotics  Prior to Admission Medications   Prescriptions Last Dose Informant Patient Reported? Taking?   brompheniramine-pseudoephedrine-DM 30-2-10 MG/5ML syrup   No No   Sig: Take 5 mL by mouth 4 (four) times a day as needed for cough   ondansetron (ZOFRAN-ODT) 4 mg disintegrating tablet   No No   Sig: Take 1 tablet (4 mg total) by mouth every 6 (six) hours as needed for nausea or vomiting      Facility-Administered Medications: None       Past Medical History:   Diagnosis Date   • Internal derangement of knee     Left - last assessed: Dec 22, 2015       Past Surgical History:   Procedure Laterality Date   • KNEE CARTILAGE SURGERY Left     2 surgeries   • TRICEPS TENDON RELEASE Left     tricep repair   • WISDOM TOOTH EXTRACTION         Family History   Problem Relation Age of Onset   • Hodgkin's lymphoma Mother    • Heart defect Mother    • No Known Problems Father      I have reviewed and agree with the history as documented      E-Cigarette/Vaping     E-Cigarette/Vaping Substances     Social History     Tobacco Use   • Smoking status: Never   • Smokeless tobacco: Never   Substance Use Topics   • Alcohol use: Yes     Comment: social         Review of Systems   Constitutional: Positive for chills  Negative for fever  HENT: Negative for ear pain and sore throat  Eyes: Negative for pain and visual disturbance  Respiratory: Negative for cough and shortness of breath  Cardiovascular: Negative for chest pain and palpitations  Gastrointestinal: Positive for diarrhea, nausea and vomiting  Negative for abdominal pain  Genitourinary: Negative for dysuria and hematuria  Musculoskeletal: Negative for arthralgias and back pain  Skin: Negative for color change and rash  Neurological: Negative for seizures and syncope  All other systems reviewed and are negative  Physical Exam  ED Triage Vitals   Temperature Pulse Respirations Blood Pressure SpO2   02/11/23 1624 02/11/23 1624 02/11/23 1624 02/11/23 1625 02/11/23 1624   100 5 °F (38 1 °C) (!) 126 16 95/86 96 %      Temp src Heart Rate Source Patient Position - Orthostatic VS BP Location FiO2 (%)   -- 02/11/23 1800 02/11/23 1800 02/11/23 1800 --    Monitor Lying Left arm       Pain Score       02/11/23 1800       Med Not Given for Pain - for MAR use only             Orthostatic Vital Signs  Vitals:    02/11/23 1624 02/11/23 1625 02/11/23 1800 02/11/23 1815   BP:  95/86 105/55 105/55   Pulse: (!) 126  104 (!) 106   Patient Position - Orthostatic VS:   Lying Lying       Physical Exam  Vitals and nursing note reviewed  Constitutional:       General: He is not in acute distress  Appearance: Normal appearance  He is well-developed and normal weight  He is not ill-appearing, toxic-appearing or diaphoretic  HENT:      Head: Normocephalic and atraumatic  Right Ear: External ear normal       Left Ear: External ear normal       Nose: Nose normal       Mouth/Throat:      Mouth: Mucous membranes are moist       Pharynx: Oropharynx is clear  Eyes:      General: No scleral icterus  Right eye: No discharge  Left eye: No discharge  Extraocular Movements: Extraocular movements intact  Conjunctiva/sclera: Conjunctivae normal       Pupils: Pupils are equal, round, and reactive to light  Cardiovascular:      Rate and Rhythm: Regular rhythm  Tachycardia present  Pulses: Normal pulses  Heart sounds: No murmur heard  Pulmonary:      Effort: Pulmonary effort is normal  No respiratory distress  Breath sounds: Normal breath sounds  No wheezing  Abdominal:      General: Abdomen is flat  There is no distension  Palpations: Abdomen is soft  Tenderness: There is no abdominal tenderness  There is no right CVA tenderness, left CVA tenderness or guarding  Musculoskeletal:         General: No swelling  Normal range of motion  Cervical back: Normal range of motion and neck supple  No rigidity  Skin:     General: Skin is warm and dry  Capillary Refill: Capillary refill takes less than 2 seconds  Neurological:      Mental Status: He is alert and oriented to person, place, and time  Cranial Nerves: No cranial nerve deficit  Sensory: No sensory deficit  Motor: No weakness  Coordination: Coordination normal       Gait: Gait normal    Psychiatric:         Mood and Affect: Mood normal          Behavior: Behavior normal          ED Medications  Medications   ondansetron (ZOFRAN) injection 4 mg (0 mg Intravenous Hold 2/11/23 1704)   multi-electrolyte (ISOLYTE-S PH 7 4) bolus 1,000 mL (1,000 mL Intravenous New Bag 2/11/23 1834)   ondansetron (ZOFRAN-ODT) dispersible tablet 4 mg (has no administration in time range)   sodium chloride 0 9 % bolus 1,000 mL (1,000 mL Intravenous New Bag 2/11/23 1703)   acetaminophen (TYLENOL) tablet 650 mg (650 mg Oral Given 2/11/23 1800)       Diagnostic Studies  Results Reviewed     Procedure Component Value Units Date/Time    Magnesium [[id number]] Collected: 02/11/23 1706    Lab Status:  In process Specimen: Blood from Arm, Right Updated: 02/11/23 1904    Urine Microscopic [[id number]]  (Abnormal) Collected: 02/11/23 1825    Lab Status: Final result Specimen: Urine, Clean Catch Updated: 02/11/23 1848     RBC, UA None Seen /hpf      WBC, UA None Seen /hpf      Epithelial Cells None Seen /hpf      Bacteria, UA None Seen /hpf      MUCUS THREADS Occasional    High Sensitivity Troponin I Random [[id number]] Collected: 02/11/23 1839    Lab Status: In process Specimen: Blood from Arm, Right Updated: 02/11/23 1846    UA w Reflex to Microscopic w Reflex to Culture [[id number]]  (Abnormal) Collected: 02/11/23 1825    Lab Status: Final result Specimen: Urine, Clean Catch Updated: 02/11/23 1843     Color, UA Yellow     Clarity, UA Clear     Specific Gravity, UA 1 025     pH, UA 6 5     Leukocytes, UA Negative     Nitrite, UA Negative     Protein, UA Trace mg/dl      Glucose, UA Negative mg/dl      Ketones, UA Negative mg/dl      Urobilinogen, UA <2 0 mg/dl      Bilirubin, UA Negative     Occult Blood, UA Negative    CBC and differential [[id number]]  (Normal) Collected: 02/11/23 1706    Lab Status: Final result Specimen: Blood from Arm, Right Updated: 02/11/23 1749     WBC 9 01 Thousand/uL      RBC 5 58 Million/uL      Hemoglobin 16 9 g/dL      Hematocrit 49 3 %      MCV 88 fL      MCH 30 3 pg      MCHC 34 3 g/dL      RDW 12 5 %      MPV 9 6 fL      Platelets 188 Thousands/uL     Narrative: This is an appended report  These results have been appended to a previously verified report      Manual Differential(PHLEBS Do Not Order) [[id number]]  (Abnormal) Collected: 02/11/23 1706    Lab Status: Final result Specimen: Blood from Arm, Right Updated: 02/11/23 1749     Segmented % 72 %      Bands % 17 %      Lymphocytes % 6 %      Monocytes % 4 %      Eosinophils, % 0 %      Basophils % 1 %      Absolute Neutrophils 8 02 Thousand/uL      Lymphocytes Absolute 0 54 Thousand/uL      Monocytes Absolute 0 36 Thousand/uL      Eosinophils Absolute 0 00 Thousand/uL      Basophils Absolute 0 09 Thousand/uL      Total Counted --     RBC Morphology Normal     Platelet Estimate Adequate    HS Troponin 0hr (reflex protocol) [[id number]]  (Normal) Collected: 02/11/23 1706    Lab Status: Final result Specimen: Blood from Arm, Right Updated: 02/11/23 1746     hs TnI 0hr 3 ng/L     Comprehensive metabolic panel [[id number]]  (Abnormal) Collected: 02/11/23 1706    Lab Status: Final result Specimen: Blood from Arm, Right Updated: 02/11/23 1742     Sodium 137 mmol/L      Potassium 4 0 mmol/L      Chloride 107 mmol/L      CO2 27 mmol/L      ANION GAP 3 mmol/L      BUN 16 mg/dL      Creatinine 1 07 mg/dL      Glucose 101 mg/dL      Calcium 9 4 mg/dL      AST 31 U/L      ALT 99 U/L      Alkaline Phosphatase 91 U/L      Total Protein 7 2 g/dL      Albumin 4 3 g/dL      Total Bilirubin 1 39 mg/dL      eGFR 91 ml/min/1 73sq m     Narrative:      [place] guidelines for Chronic Kidney Disease (CKD):   •  Stage 1 with normal or high GFR (GFR > 90 mL/min/1 73 square meters)  •  Stage 2 Mild CKD (GFR = 60-89 mL/min/1 73 square meters)  •  Stage 3A Moderate CKD (GFR = 45-59 mL/min/1 73 square meters)  •  Stage 3B Moderate CKD (GFR = 30-44 mL/min/1 73 square meters)  •  Stage 4 Severe CKD (GFR = 15-29 mL/min/1 73 square meters)  •  Stage 5 End Stage CKD (GFR <15 mL/min/1 73 square meters)  Note: GFR calculation is accurate only with a steady state creatinine    Lipase [[id number]]  (Normal) Collected: 02/11/23 1706    Lab Status: Final result Specimen: Blood from Arm, Right Updated: 02/11/23 1742     Lipase 109 u/L     Lactic acid, plasma [[id number]]  (Normal) Collected: 02/11/23 1706    Lab Status: Final result Specimen: Blood from Arm, Right Updated: 02/11/23 1741     LACTIC ACID 1 4 mmol/L     Narrative:      Result may be elevated if tourniquet was used during collection                   No orders to display         Procedures  ECG 12 Lead Documentation Only    Date/Time: 2/11/2023 5:06 PM  Performed by: Maricel Paulson MD  Authorized by: Maricel Paulson MD     Indications / Diagnosis:  Syncope  ECG reviewed by me, the ED Provider: yes    Patient location:  ED  Previous ECG:     Previous ECG:  Unavailable    Comparison to cardiac monitor: Yes    Interpretation:     Interpretation: non-specific    Rate:     ECG rate:  114    ECG rate assessment: tachycardic    Rhythm:     Rhythm: sinus tachycardia    Ectopy:     Ectopy: none    QRS:     QRS axis:  Normal    QRS intervals:  Normal  Conduction:     Conduction: normal    ST segments:     ST segments:  Normal  T waves:     T waves: non-specific            ED Course                                       Medical Decision Making  This patient presents with  nausea, vomiting & diarrhea  Differential diagnoses includes possible acute gastroenteritis  Abdominal exam without peritoneal signs  Currently hypoeuvolemic based on VS  No evidence of surgical abdomen or other acute medical emergency including bowel obstruction, viscus perforation, vascular catastrophe, atypical appendicitis, acute cholecystitis at this time  Presentation not consistent with other acute, emergent causes of vomiting / diarrhea at this time  No indication for abdominal imaging  Patient's friends report dozing off at bar  Cannot definitively rule out syncope  I think arrhythmia is unlikely  EKG shows normal sinus rhythm with no interval abnormalities such as QT prolongation or WPW  There are no findings to suggest Brugada syndrome  Cardiac monitoring in the emergency department reveals no tachycardic or bradycardic dysrhythmia  Hypertrophic cardio-myopathy was considered but there are no clear historical elements pointing toward this  EKG is not suggestive  The QRS voltage is not extremely large and there are no suggestive Q waves      Plan: supportive care, oral // IV rehydration, electrolytes, EKG, serial abdominal exam, reassess    Acute gastroenteritis: acute illness or injury  Amount and/or Complexity of Data Reviewed  Independent Historian: spouse  Labs: ordered  ECG/medicine tests: ordered and independent interpretation performed  Risk  OTC drugs  Prescription drug management  Disposition  Final diagnoses:   Acute gastroenteritis     Time reflects when diagnosis was documented in both MDM as applicable and the Disposition within this note     Time User Action Codes Description Comment    2/11/2023  5:23 PM Brayan Pelaez Add [K52 9] Acute gastroenteritis       ED Disposition     None      Follow-up Information    None         Patient's Medications   Discharge Prescriptions    No medications on file     No discharge procedures on file  PDMP Review     None           ED Provider  Attending physically available and evaluated Payton Alexander I managed the patient along with the ED Attending      Electronically Signed by

## 2023-02-11 NOTE — Clinical Note
Mckinley Toth was seen and treated in our emergency department on 2/11/2023  Diagnosis:     Marda Holter    He may return on this date: If you have any questions or concerns, please don't hesitate to call        Enrique Sahni MD    ______________________________           _______________          _______________  Hospital Representative                              Date                                Time

## 2023-02-11 NOTE — ED ATTENDING ATTESTATION
2/11/2023  IBeulah MD, saw and evaluated the patient  I have discussed the patient with the resident/non-physician practitioner and agree with the resident's/non-physician practitioner's findings, Plan of Care, and MDM as documented in the resident's/non-physician practitioner's note, except where noted  All available labs and Radiology studies were reviewed  I was present for key portions of any procedure(s) performed by the resident/non-physician practitioner and I was immediately available to provide assistance  At this point I agree with the current assessment done in the Emergency Department  I have conducted an independent evaluation of this patient a history and physical is as follows:    OA: 29 y/o m with multiple episodes of non-bloody, non-bilious vomiting and non-bloody stools this morning  Patient states that he woke this morning with what he felt was a gas pain  He then proceeded to have approximately 3-4 episodes of large-volume nonbloody diarrhea  He then fell initially improved and took a shower  After the shower he felt extremely nauseous and weak and had a large volume emesis that he describes as brown liquid  Nonbloody nonbilious otherwise  After vomiting he began to feel improved  He was able to tolerate some Gatorade  He then went to meet up with friends at a bar/restaurant  He did not drink any alcohol but rather was drinking his Gatorade  He does note that he continued to feel unwell at that time  Felt pale and sweaty  It is reported the patient had a brief syncopal episode after this and then awoke without any difficulty followed by another large volume emesis and diarrhea  Patient denies any abdominal pain but still complains of nausea  Generally feels weak at this time  Denies any associated chest pain pressure palpitations  No shortness of breath  No headache  No dizziness  No numbness weakness or tingling  Denies any recent travel    Did eat a prepackaged lasagna from [facility] last night and a bite of cookie dough but denies any other new foods  Denies any history of cardiac disease  On exam patient is nontoxic  He has a low-grade temperature is tachycardic with borderline blood pressure  HEENT is normocephalic and atraumatic  Pink conjunctive a  Mildly dry mucous membranes  Neck is supple forage of motion  No murmurs  Lungs clear no wheezing rhonchi or rales  Abdomen is soft with positive bowel sounds no rebound no guarding nontender to palpation  No CVA tenderness palpation  No edema  No calf tenderness to palpation  Intact pulses  Capillary fill less than 2 seconds  Awake alert oriented and appropriate  Moving all extremities equally  Symmetric face and clear speech  Assessment and plan nausea vomiting diarrhea currently improved but still with nausea  Will obtain abdominal labs give IV fluid hydration as patient has not been tolerating baseline today  Patient also had a syncopal episode during these episodes  Most likely vasovagal however will obtain EKG and troponin x1  We will continue to monitor patient on telemetry  We will give IV fluid hydration  We will give IV antiemetic  Will reevaluate and treat accordingly  Portions of the record may have been created with voice recognition software  Occasional wrong word or “sound-a-like" substitutions may have occurred due to the inherent limitations of voice recognition software  Review chart carefully and recognize, using context, where substitutions have occurred  ED Course     Discussed EKG with cardiology on-call  States that T wave inversions are very nonspecific and not indicative of any pathology for syncopal reaction  With no cardiac risk factors and negative troponin okay to follow-up as outpatient  Also note no other concerning signs or symptoms reviewed on EKG such as Brugada or prolonged QT  This was then also discussed directly with patient and his wife  They do feel comfortable with following up as outpatient In regard to EKG  Discussed in detail patient's labs including bandemia  Discussed that could be secondary to nausea vomiting however also could be due to infectious or other cause  Patient receiving IV fluid hydration at this time declines any CT imaging  Orthostatics were performed and patient did feel nauseous and unwell with standing with + orthostatics and decided that he would like to proceed with CT imaging  Discussed that if patient remains symptomatic with orthostasis as well as with ongoing nausea will recommend admission to the hospital for ongoing IV fluid hydration and observation  Patient and wife expressed understanding  Pt still with fatigue and tachycardia  Discussed CT results with patient  Discussed that likely diarrheal illness but larger loops of bowel  I have offered patient admission for ongoing IVF hydration and ongoing observation given presentation, history and findings on labs and imaging v dc    Discussed risks and benefits of both  Awaiting wife who is a cardiac nurse to return for decision  SLIM made aware as pt is a deciding on admission  PT decided to go home howver then with return of sxms  Will continue IVF hdyration  Discussed with admitting  Portions of the record may have been created with voice recognition software  Occasional wrong word or “sound-a-like" substitutions may have occurred due to the inherent limitations of voice recognition software  Review chart carefully and recognize, using context, where substitutions have occurred      Critical Care Time  Procedures

## 2023-02-11 NOTE — ED PROVIDER NOTES
History  Chief Complaint   Patient presents with   • Vomiting     Has vomited multiple times today w/ several cases of diarrhea in the morning  Took tylenol, tums and gasx PTA  Very brief syncopal episode around 130      51-year-old male with no significant prior medical history presents with nausea, vomiting, and diarrhea since this morning  Patient reports several episodes of large-volume watery vomiting this morning and loose stools  Patient felt better after vomiting  Shortly prior to arrival patient was at a bar when he experienced rebound nausea and vomited watery vomit  Patient's friends were with in the bar who note that patient was nodding off at the bar  Denies chest pain, abdominal pain, back pain, fevers, history of abdominal surgeries, history of diabetes, recent travel, or recent antibiotics  Prior to Admission Medications   Prescriptions Last Dose Informant Patient Reported? Taking?   brompheniramine-pseudoephedrine-DM 30-2-10 MG/5ML syrup   No No   Sig: Take 5 mL by mouth 4 (four) times a day as needed for cough   ondansetron (ZOFRAN-ODT) 4 mg disintegrating tablet   No No   Sig: Take 1 tablet (4 mg total) by mouth every 6 (six) hours as needed for nausea or vomiting      Facility-Administered Medications: None       Past Medical History:   Diagnosis Date   • Internal derangement of knee     Left - last assessed: Dec 22, 2015       Past Surgical History:   Procedure Laterality Date   • KNEE CARTILAGE SURGERY Left     2 surgeries   • TRICEPS TENDON RELEASE Left     tricep repair   • WISDOM TOOTH EXTRACTION         Family History   Problem Relation Age of Onset   • Hodgkin's lymphoma Mother    • Heart defect Mother    • No Known Problems Father      I have reviewed and agree with the history as documented      E-Cigarette/Vaping     E-Cigarette/Vaping Substances     Social History     Tobacco Use   • Smoking status: Never   • Smokeless tobacco: Never   Substance Use Topics   • Alcohol use: Yes     Comment: social         Review of Systems   Constitutional: Positive for chills  Negative for fever  HENT: Negative for ear pain and sore throat  Eyes: Negative for pain and visual disturbance  Respiratory: Negative for cough and shortness of breath  Cardiovascular: Negative for chest pain and palpitations  Gastrointestinal: Positive for diarrhea, nausea and vomiting  Negative for abdominal pain  Genitourinary: Negative for dysuria and hematuria  Musculoskeletal: Negative for arthralgias and back pain  Skin: Negative for color change and rash  Neurological: Negative for seizures and syncope  All other systems reviewed and are negative  Physical Exam  ED Triage Vitals   Temperature Pulse Respirations Blood Pressure SpO2   02/11/23 1624 02/11/23 1624 02/11/23 1624 02/11/23 1625 02/11/23 1624   100 5 °F (38 1 °C) (!) 126 16 95/86 96 %      Temp src Heart Rate Source Patient Position - Orthostatic VS BP Location FiO2 (%)   -- 02/11/23 1800 02/11/23 1800 02/11/23 1800 --    Monitor Lying Left arm       Pain Score       02/11/23 1800       Med Not Given for Pain - for MAR use only             Orthostatic Vital Signs  Vitals:    02/12/23 0023 02/12/23 0023 02/12/23 0759 02/12/23 0759   BP: 123/77 123/77 117/75 117/75   Pulse:  97 70 74   Patient Position - Orthostatic VS:           Physical Exam  Vitals and nursing note reviewed  Constitutional:       General: He is not in acute distress  Appearance: Normal appearance  He is well-developed and normal weight  He is not ill-appearing, toxic-appearing or diaphoretic  HENT:      Head: Normocephalic and atraumatic  Right Ear: External ear normal       Left Ear: External ear normal       Nose: Nose normal       Mouth/Throat:      Mouth: Mucous membranes are moist       Pharynx: Oropharynx is clear  Eyes:      General: No scleral icterus  Right eye: No discharge  Left eye: No discharge        Extraocular Movements: Extraocular movements intact  Conjunctiva/sclera: Conjunctivae normal       Pupils: Pupils are equal, round, and reactive to light  Cardiovascular:      Rate and Rhythm: Regular rhythm  Tachycardia present  Pulses: Normal pulses  Heart sounds: No murmur heard  Pulmonary:      Effort: Pulmonary effort is normal  No respiratory distress  Breath sounds: Normal breath sounds  No wheezing  Abdominal:      General: Abdomen is flat  There is no distension  Palpations: Abdomen is soft  Tenderness: There is no abdominal tenderness  There is no right CVA tenderness, left CVA tenderness or guarding  Musculoskeletal:         General: No swelling  Normal range of motion  Cervical back: Normal range of motion and neck supple  No rigidity  Skin:     General: Skin is warm and dry  Capillary Refill: Capillary refill takes less than 2 seconds  Neurological:      Mental Status: He is alert and oriented to person, place, and time  Cranial Nerves: No cranial nerve deficit  Sensory: No sensory deficit  Motor: No weakness        Coordination: Coordination normal       Gait: Gait normal    Psychiatric:         Mood and Affect: Mood normal          Behavior: Behavior normal          ED Medications  Medications   sodium chloride 0 9 % bolus 1,000 mL (0 mL Intravenous Stopped 2/11/23 1951)   acetaminophen (TYLENOL) tablet 650 mg (650 mg Oral Given 2/11/23 1800)   multi-electrolyte (ISOLYTE-S PH 7 4) bolus 1,000 mL (0 mL Intravenous Stopped 2/11/23 1951)   iohexol (OMNIPAQUE) 350 MG/ML injection (SINGLE-DOSE) 100 mL (90 mL Intravenous Given 2/11/23 2129)       Diagnostic Studies  Results Reviewed     Procedure Component Value Units Date/Time    Magnesium [[id number]]  (Normal) Collected: 02/11/23 1706    Lab Status: Final result Specimen: Blood from Arm, Right Updated: 02/11/23 1913     Magnesium 1 9 mg/dL     High Sensitivity Troponin I Random [[id number]]  (Abnormal) Collected: 02/11/23 1839    Lab Status: Final result Specimen: Blood from Arm, Right Updated: 02/11/23 1913     HS TnI random 3 ng/L     Urine Microscopic [[id number]]  (Abnormal) Collected: 02/11/23 1825    Lab Status: Final result Specimen: Urine, Clean Catch Updated: 02/11/23 1848     RBC, UA None Seen /hpf      WBC, UA None Seen /hpf      Epithelial Cells None Seen /hpf      Bacteria, UA None Seen /hpf      MUCUS THREADS Occasional    UA w Reflex to Microscopic w Reflex to Culture [[id number]]  (Abnormal) Collected: 02/11/23 1825    Lab Status: Final result Specimen: Urine, Clean Catch Updated: 02/11/23 1843     Color, UA Yellow     Clarity, UA Clear     Specific Gravity, UA 1 025     pH, UA 6 5     Leukocytes, UA Negative     Nitrite, UA Negative     Protein, UA Trace mg/dl      Glucose, UA Negative mg/dl      Ketones, UA Negative mg/dl      Urobilinogen, UA <2 0 mg/dl      Bilirubin, UA Negative     Occult Blood, UA Negative    CBC and differential [[id number]]  (Normal) Collected: 02/11/23 1706    Lab Status: Final result Specimen: Blood from Arm, Right Updated: 02/11/23 1749     WBC 9 01 Thousand/uL      RBC 5 58 Million/uL      Hemoglobin 16 9 g/dL      Hematocrit 49 3 %      MCV 88 fL      MCH 30 3 pg      MCHC 34 3 g/dL      RDW 12 5 %      MPV 9 6 fL      Platelets 693 Thousands/uL     Narrative: This is an appended report  These results have been appended to a previously verified report      Manual Differential(PHLEBS Do Not Order) [[id number]]  (Abnormal) Collected: 02/11/23 1706    Lab Status: Final result Specimen: Blood from Arm, Right Updated: 02/11/23 1749     Segmented % 72 %      Bands % 17 %      Lymphocytes % 6 %      Monocytes % 4 %      Eosinophils, % 0 %      Basophils % 1 %      Absolute Neutrophils 8 02 Thousand/uL      Lymphocytes Absolute 0 54 Thousand/uL      Monocytes Absolute 0 36 Thousand/uL      Eosinophils Absolute 0 00 Thousand/uL      Basophils Absolute 0 09 Thousand/uL Total Counted --     RBC Morphology Normal     Platelet Estimate Adequate    HS Troponin 0hr (reflex protocol) [[id number]]  (Normal) Collected: 02/11/23 1706    Lab Status: Final result Specimen: Blood from Arm, Right Updated: 02/11/23 1746     hs TnI 0hr 3 ng/L     Comprehensive metabolic panel [[id number]]  (Abnormal) Collected: 02/11/23 1706    Lab Status: Final result Specimen: Blood from Arm, Right Updated: 02/11/23 1742     Sodium 137 mmol/L      Potassium 4 0 mmol/L      Chloride 107 mmol/L      CO2 27 mmol/L      ANION GAP 3 mmol/L      BUN 16 mg/dL      Creatinine 1 07 mg/dL      Glucose 101 mg/dL      Calcium 9 4 mg/dL      AST 31 U/L      ALT 99 U/L      Alkaline Phosphatase 91 U/L      Total Protein 7 2 g/dL      Albumin 4 3 g/dL      Total Bilirubin 1 39 mg/dL      eGFR 91 ml/min/1 73sq m     Narrative:      [place] guidelines for Chronic Kidney Disease (CKD):   •  Stage 1 with normal or high GFR (GFR > 90 mL/min/1 73 square meters)  •  Stage 2 Mild CKD (GFR = 60-89 mL/min/1 73 square meters)  •  Stage 3A Moderate CKD (GFR = 45-59 mL/min/1 73 square meters)  •  Stage 3B Moderate CKD (GFR = 30-44 mL/min/1 73 square meters)  •  Stage 4 Severe CKD (GFR = 15-29 mL/min/1 73 square meters)  •  Stage 5 End Stage CKD (GFR <15 mL/min/1 73 square meters)  Note: GFR calculation is accurate only with a steady state creatinine    Lipase [[id number]]  (Normal) Collected: 02/11/23 1706    Lab Status: Final result Specimen: Blood from Arm, Right Updated: 02/11/23 1742     Lipase 109 u/L     Lactic acid, plasma [[id number]]  (Normal) Collected: 02/11/23 1706    Lab Status: Final result Specimen: Blood from Arm, Right Updated: 02/11/23 1741     LACTIC ACID 1 4 mmol/L     Narrative:      Result may be elevated if tourniquet was used during collection                   CT abdomen pelvis with contrast   Final Result by Rei Lizarraga MD (02/11 2151)      Multiple loops of prominent but nondilated fluid-filled small bowel throughout the abdomen and pelvis with a small amount of liquid stool within the colon, suggestive of a gastroenteritis/diarrhea  No evidence of obstruction  If symptoms persist, a    repeat CT abdomen/pelvis after the administration of oral contrast could be performed for further evaluation  No free air or free fluid  Normal appendix visualized  Workstation performed: [id number]               Procedures  ECG 12 Lead Documentation Only    Date/Time: 2/11/2023 5:06 PM  Performed by: Ja Tucker MD  Authorized by: Ja Tucker MD     Indications / Diagnosis:  Syncope  ECG reviewed by me, the ED Provider: yes    Patient location:  ED  Previous ECG:     Previous ECG:  Unavailable    Comparison to cardiac monitor: Yes    Interpretation:     Interpretation: non-specific    Rate:     ECG rate:  114    ECG rate assessment: tachycardic    Rhythm:     Rhythm: sinus tachycardia    Ectopy:     Ectopy: none    QRS:     QRS axis:  Normal    QRS intervals:  Normal  Conduction:     Conduction: normal    ST segments:     ST segments:  Normal  T waves:     T waves: non-specific            ED Course                                       Medical Decision Making  This patient presents with  nausea, vomiting & diarrhea  Differential diagnoses includes possible acute gastroenteritis  Abdominal exam without peritoneal signs  Currently hypoeuvolemic based on VS  No evidence of surgical abdomen or other acute medical emergency including bowel obstruction, viscus perforation, vascular catastrophe, atypical appendicitis, acute cholecystitis at this time  Presentation not consistent with other acute, emergent causes of vomiting / diarrhea at this time  No indication for abdominal imaging  Patient's friends report dozing off at bar  Cannot definitively rule out syncope  I think arrhythmia is unlikely   EKG shows normal sinus rhythm with no interval abnormalities such as QT prolongation or WPW  There are no findings to suggest Brugada syndrome  Cardiac monitoring in the emergency department reveals no tachycardic or bradycardic dysrhythmia  Hypertrophic cardio-myopathy was considered but there are no clear historical elements pointing toward this  EKG is not suggestive  The QRS voltage is not extremely large and there are no suggestive Q waves  Plan: supportive care, oral // IV rehydration, electrolytes, EKG, serial abdominal exam, reassess    Patient signed out for final disposition  Acute gastroenteritis: acute illness or injury  Amount and/or Complexity of Data Reviewed  Independent Historian: spouse  Labs: ordered  Radiology: ordered  ECG/medicine tests: ordered and independent interpretation performed  Risk  OTC drugs  Prescription drug management  Disposition  Final diagnoses:   Acute gastroenteritis   Nausea and vomiting, unspecified vomiting type   Dehydration   Diarrhea     Time reflects when diagnosis was documented in both MDM as applicable and the Disposition within this note     Time User Action Codes Description Comment    2/11/2023  5:23 PM Bola Rosariost Add [K52 9] Acute gastroenteritis     2/11/2023 11:41 PM Manuel Ermelinda Add [R11 2] Nausea and vomiting, unspecified vomiting type     2/11/2023 11:41 PM Manuel Ermelinda Add [E86 0] Dehydration     2/11/2023 11:41 PM Manuel Ermelinda Add [R19 7] Diarrhea       ED Disposition     ED Disposition   Admit    Condition   Stable    Date/Time   Sat Feb 11, 2023 11:43 PM    Comment   Case was discussed with CARLIE and the patient's admission status was agreed to be Admission Status: observation status to the service of Dr Vicente Donovan             Follow-up Information     Follow up With Specialties Details Why Contact Info    Re Arthur MD Family Medicine Schedule an appointment as soon as possible for a visit  [address] [place] [address] Julio Cesar Reginaldo Ortiz [address]  [phone]            Discharge Medication List as of 2/12/2023  9:19 AM      CONTINUE these medications which have NOT CHANGED    Details   brompheniramine-pseudoephedrine-DM 30-2-10 MG/5ML syrup Take 5 mL by mouth 4 (four) times a day as needed for cough, Starting Tue 8/2/2022, Normal      ondansetron (ZOFRAN-ODT) 4 mg disintegrating tablet Take 1 tablet (4 mg total) by mouth every 6 (six) hours as needed for nausea or vomiting, Starting Wed 3/9/2022, Normal           Outpatient Discharge Orders   Call provider for:  persistent nausea or vomiting     Call provider for:  severe uncontrolled pain     Call provider for:  extreme fatigue     Call provider for:  persistent dizziness or light-headedness       PDMP Review       Value Time User    PDMP Reviewed  Yes 2/12/2023 12:05 AM Maxx Astorga DO           ED Provider  Attending physically available and evaluated Cynthia Don  I managed the patient along with the ED Attending      Electronically Signed by         Ja Tucker MD  02/12/23 2906

## 2023-02-11 NOTE — Clinical Note
Mae Aguilarsant discharge to home/self care  Pt c/o decreased fetal movement. Went to [facility] where they detected heart tones but rec patient come [facility]. Denies leakage of fluids or vaginal bleeding. Reports some irregular contractions. VSS. Placed fetal monitor and cont toco. Notified MD of arrival.

## 2023-02-12 VITALS
OXYGEN SATURATION: 96 % | TEMPERATURE: 97.7 F | DIASTOLIC BLOOD PRESSURE: 75 MMHG | HEART RATE: 74 BPM | SYSTOLIC BLOOD PRESSURE: 117 MMHG | WEIGHT: 275 LBS | RESPIRATION RATE: 17 BRPM | BODY MASS INDEX: 35.03 KG/M2

## 2023-02-12 RX ORDER — ONDANSETRON 2 MG/ML
4 INJECTION INTRAMUSCULAR; INTRAVENOUS EVERY 6 HOURS PRN
Status: DISCONTINUED | OUTPATIENT
Start: 2023-02-12 | End: 2023-02-12 | Stop reason: HOSPADM

## 2023-02-12 RX ORDER — SODIUM CHLORIDE 9 MG/ML
125 INJECTION, SOLUTION INTRAVENOUS CONTINUOUS
Status: DISCONTINUED | OUTPATIENT
Start: 2023-02-12 | End: 2023-02-12 | Stop reason: HOSPADM

## 2023-02-12 RX ORDER — ACETAMINOPHEN 325 MG/1
650 TABLET ORAL EVERY 6 HOURS PRN
Status: DISCONTINUED | OUTPATIENT
Start: 2023-02-12 | End: 2023-02-12 | Stop reason: HOSPADM

## 2023-02-12 RX ADMIN — ACETAMINOPHEN 650 MG: 325 TABLET ORAL at 00:36

## 2023-02-12 RX ADMIN — SODIUM CHLORIDE 125 ML/HR: 0.9 INJECTION, SOLUTION INTRAVENOUS at 00:36

## 2023-02-12 NOTE — H&P
[address] [facility]  H&P- Valeri Arnold 1990, 28 y o  male MRN: [id number]  Unit/Bed#: ED 11 Encounter: [id number]  Primary Care Provider: Lupe Tran MD   Date and time admitted to hospital: 2/11/2023  4:26 PM    Syncope  Assessment & Plan  · Reported brief syncopal episode as below, patient with intermittent dizziness standing but improving following IV fluids  · Troponin negative, EKG normal sinus rhythm with multiple nonspecific ST and T wave changes, telemetry without abnormalities  Orthostatics negative  · Suspect related to gastroenteritis, IV fluids as above  Repeat EKG in a m  * Gastroenteritis  Assessment & Plan  · 1 day history of nausea/vomiting/diarrhea, with persistent symptoms despite multiple times of symptom control during ED evaluation  · Labs without marked abnormality for noted bandemia, however CT ab/pelvis just of gastroenteritis/diarrhea without obstruction  · Admit for observation given ongoing symptoms: Continue supportive management with IV fluids, antiemetic  Advance diet as tolerated  VTE Prophylaxis: Pharmacologic VTE Prophylaxis contraindicated due to Low risk  / reason for no mechanical VTE prophylaxis Low risk, ambulation encouraged   Code Status: Full code  POLST: POLST form is not discussed and not completed at this time  Discussion with family: Wife at bedside    Anticipated Length of Stay:  Patient will be admitted on an Observation basis with an anticipated length of stay of less than 2 midnights  Justification for Hospital Stay: Please see detailed plans noted above  Chief Complaint:     Nausea/vomiting/diarrhea, syncopal episode  History of Present Illness:  Valeri Arnold is a 28 y o  male significant past medical history presenting with multiple episodes of nausea/vomiting and diarrhea on the date of presentation    Initially had multiple episodes of nonbloody diarrhea followed by nausea with vomiting initially with improvement and went to visit friends at a [employer] where he did not drink alcohol but reported feeling unwell and had apparent brief syncopal episode followed by vomiting/diarrhea which prompted the presentation here  Denies fever/chills, known sick contacts, chest pain/pressure, shortness of breath, or other systemic symptoms  Did state he ate prepackaged lasagna from [facility] last night and bite of cookie dough but no other unusual foods  Received antiemetics and IV fluids during ED evaluation with only partial control of symptoms however with ongoing dizziness and nausea/diarrhea  For syncopal episode troponins were obtained and found negative and EKG normal sinus rhythm with nonspecific T wave changes; additionally underwent CT ab/pelvis which was significant only for multiple dilated loops of bowel suspicious for gastroenteritis/diarrhea but without obstruction or other pathology identified  Given ongoing symptoms we will admit as observation for overnight management        Review of Systems:    Constitutional:  Denies fever or chills   Eyes:  Denies change in visual acuity   HENT:  Denies nasal congestion or sore throat   Respiratory:  Denies cough or shortness of breath   Cardiovascular:  Denies chest pain or edema   GI:  Denies abdominal pain but reported nausea, vomiting, diarrhea  :  Denies dysuria   Musculoskeletal:  Denies back pain or joint pain   Integument:  Denies rash   Neurologic:  Denies headache, focal weakness or sensory changes but reported syncope  Endocrine:  Denies polyuria or polydipsia   Lymphatic:  Denies swollen glands   Psychiatric:  Denies depression or anxiety     Past Medical and Surgical History:   Past Medical History:   Diagnosis Date   • Internal derangement of knee     Left - last assessed: Dec 22, 2015     Past Surgical History:   Procedure Laterality Date   • KNEE CARTILAGE SURGERY Left     2 surgeries   • TRICEPS TENDON RELEASE Left     tricep repair   • MIKE TOOTH EXTRACTION         Meds/Allergies:    Current Facility-Administered Medications:   •  sodium chloride 0 9 % infusion, 75 mL/hr, Intravenous, Continuous, Serena Atkinson MD, Last Rate: 75 mL/hr at 02/11/23 2134, 75 mL/hr at 02/11/23 2134    Current Outpatient Medications:   •  brompheniramine-pseudoephedrine-DM 30-2-10 MG/5ML syrup, Take 5 mL by mouth 4 (four) times a day as needed for cough, Disp: 200 mL, Rfl: 0  •  ondansetron (ZOFRAN-ODT) 4 mg disintegrating tablet, Take 1 tablet (4 mg total) by mouth every 6 (six) hours as needed for nausea or vomiting, Disp: 20 tablet, Rfl: 0    Allergies: No Known Allergies  History:  Marital Status: /Civil Union     Substance Use History:   Social History     Substance and Sexual Activity   Alcohol Use Yes    Comment: social      Social History     Tobacco Use   Smoking Status Never   Smokeless Tobacco Never     Social History     Substance and Sexual Activity   Drug Use Not on file       Family History:  Family History   Problem Relation Age of Onset   • Hodgkin's lymphoma Mother    • Heart defect Mother    • No Known Problems Father        Physical Exam:     Vitals:   Blood Pressure: 103/55 (02/11/23 1955)  Pulse: (!) 112 (02/11/23 1955)  Temperature: 100 5 °F (38 1 °C) (02/11/23 1624)  Respirations: 18 (02/11/23 1955)  Weight - Scale: 125 kg (275 lb) (02/11/23 1624)  SpO2: 97 % (02/11/23 1955)    Constitutional:  Well developed, well nourished, no acute distress, non-toxic appearance   Eyes:  PERRL, conjunctiva normal   HENT:  Atraumatic, external ears normal, nose normal, oropharynx moist, no pharyngeal exudates   Neck- normal range of motion, no tenderness, supple   Respiratory:  No respiratory distress, normal breath sounds, no rales, no wheezing   Cardiovascular:  Normal rate, normal rhythm, no murmurs, no gallops, no rubs   GI:  Soft, nondistended, normal bowel sounds, nontender, no organomegaly, no mass, no rebound, no guarding   :  No costovertebral angle tenderness   Musculoskeletal:  No edema, no tenderness, no deformities  Back- no tenderness  Integument:  Well hydrated, no rash   Lymphatic:  No lymphadenopathy noted   Neurologic:  Alert &awake, communicative, CN 2-12 normal, normal motor function, normal sensory function, no focal deficits noted   Psychiatric:  Speech and behavior appropriate       Lab Results: I have personally reviewed pertinent reports  Results from last 7 days   Lab Units 02/11/23  1706   WBC Thousand/uL 9 01   HEMOGLOBIN g/dL 16 9   HEMATOCRIT % 49 3   PLATELETS Thousands/uL 197   LYMPHO PCT % 6*   MONO PCT % 4   EOS PCT % 0     Results from last 7 days   Lab Units 02/11/23  1706   POTASSIUM mmol/L 4 0   CHLORIDE mmol/L 107   CO2 mmol/L 27   BUN mg/dL 16   CREATININE mg/dL 1 07   CALCIUM mg/dL 9 4   ALK PHOS U/L 91   ALT U/L 99*   AST U/L 31           EKG: Normal sinus rhythm HR 94, nonspecific ST-T wave abnormalities    Imaging: I have personally reviewed pertinent reports  CT abdomen pelvis with contrast    Result Date: 2/11/2023  Narrative: CT ABDOMEN AND PELVIS WITH IV CONTRAST INDICATION:   Bowel obstruction suspected r/o obstruction  COMPARISON:  None  TECHNIQUE:  CT examination of the abdomen and pelvis was performed  Axial, sagittal, and coronal 2D reformatted images were created from the source data and submitted for interpretation  Radiation dose length product (DLP) for this visit:  924 45 mGy-cm   This examination, like all CT scans performed in the [facility], was performed utilizing techniques to minimize radiation dose exposure, including the use of iterative  reconstruction and automated exposure control  IV Contrast:  90 mL of iohexol (OMNIPAQUE) Enteric Contrast:  Enteric contrast was not administered   FINDINGS: ABDOMEN LOWER CHEST:  No clinically significant abnormality identified in the visualized lower chest  LIVER/BILIARY TREE:  Liver is diffusely decreased in density consistent with fatty change  No CT evidence of suspicious hepatic mass  Normal hepatic contours  No biliary dilatation  GALLBLADDER:  No calcified gallstones  No pericholecystic inflammatory change  SPLEEN:  Unremarkable  PANCREAS:  Unremarkable  ADRENAL GLANDS:  Unremarkable  KIDNEYS/URETERS:  Unremarkable  No hydronephrosis  STOMACH AND BOWEL:  There are multiple prominent but nondilated fluid-filled loops of small bowel throughout the abdomen and pelvis  Small amount of liquid stool within the colon  No evidence of obstruction  APPENDIX:  A normal appendix was visualized  ABDOMINOPELVIC CAVITY:  No ascites  No pneumoperitoneum  No lymphadenopathy  VESSELS:  Unremarkable for patient's age  PELVIS REPRODUCTIVE ORGANS:  Unremarkable for patient's age  URINARY BLADDER:  Unremarkable  ABDOMINAL WALL/INGUINAL REGIONS:  Unremarkable  OSSEOUS STRUCTURES:  No acute fracture or destructive osseous lesion  Impression: Multiple loops of prominent but nondilated fluid-filled small bowel throughout the abdomen and pelvis with a small amount of liquid stool within the colon, suggestive of a gastroenteritis/diarrhea  No evidence of obstruction  If symptoms persist, a repeat CT abdomen/pelvis after the administration of oral contrast could be performed for further evaluation  No free air or free fluid  Normal appendix visualized  Workstation performed: [id number]         ** Please Note: Dragon 360 Dictation voice to text software was used in the creation of this document   **

## 2023-02-12 NOTE — ASSESSMENT & PLAN NOTE
· 1 day history of nausea/vomiting/diarrhea, with persistent symptoms despite multiple times of symptom control during ED evaluation  · Labs without marked abnormality for noted bandemia, however CT ab/pelvis just of gastroenteritis/diarrhea without obstruction  · Admit for observation given ongoing symptoms: Continue supportive management with IV fluids, antiemetic  Advance diet as tolerated

## 2023-02-12 NOTE — PLAN OF CARE
Problem: Nutrition/Hydration-ADULT  Goal: Nutrient/Hydration intake appropriate for improving, restoring or maintaining nutritional needs  Description: Monitor and assess patient's nutrition/hydration status for malnutrition  Collaborate with interdisciplinary team and initiate plan and interventions as ordered  Monitor patient's weight and dietary intake as ordered or per policy  Utilize nutrition screening tool and intervene as necessary  Determine patient's food preferences and provide high-protein, high-caloric foods as appropriate       INTERVENTIONS:  - Monitor oral intake, urinary output, labs, and treatment plans  - Assess nutrition and hydration status and recommend course of action  - Evaluate amount of meals eaten  - Assist patient with eating if necessary   - Allow adequate time for meals  - Recommend/ encourage appropriate diets, oral nutritional supplements, and vitamin/mineral supplements  - Order, calculate, and assess calorie counts as needed  - Recommend, monitor, and adjust tube feedings and TPN/PPN based on assessed needs  - Assess need for intravenous fluids  - Provide specific nutrition/hydration education as appropriate  - Include patient/family/caregiver in decisions related to nutrition  Outcome: Progressing     Problem: GASTROINTESTINAL - ADULT  Goal: Minimal or absence of nausea and/or vomiting  Description: INTERVENTIONS:  - Administer IV fluids if ordered to ensure adequate hydration  - Maintain NPO status until nausea and vomiting are resolved  - Nasogastric tube if ordered  - Administer ordered antiemetic medications as needed  - Provide nonpharmacologic comfort measures as appropriate  - Advance diet as tolerated, if ordered  - Consider nutrition services referral to assist patient with adequate nutrition and appropriate food choices  Outcome: Progressing  Goal: Maintains or returns to baseline bowel function  Description: INTERVENTIONS:  - Assess bowel function  - Encourage oral fluids to ensure adequate hydration  - Administer IV fluids if ordered to ensure adequate hydration  - Administer ordered medications as needed  - Encourage mobilization and activity  - Consider nutritional services referral to assist patient with adequate nutrition and appropriate food choices  Outcome: Progressing  Goal: Maintains adequate nutritional intake  Description: INTERVENTIONS:  - Monitor percentage of each meal consumed  - Identify factors contributing to decreased intake, treat as appropriate  - Assist with meals as needed  - Monitor I&O, weight, and lab values if indicated  - Obtain nutrition services referral as needed  Outcome: Progressing  Goal: Oral mucous membranes remain intact  Description: INTERVENTIONS  - Assess oral mucosa and hygiene practices  - Implement preventative oral hygiene regimen  - Implement oral medicated treatments as ordered  - Initiate Nutrition services referral as needed  Outcome: Progressing

## 2023-02-12 NOTE — ASSESSMENT & PLAN NOTE
· Reported brief syncopal episode as below, patient with intermittent dizziness standing but improving following IV fluids  · Troponin negative, EKG normal sinus rhythm with multiple nonspecific ST and T wave changes, telemetry without abnormalities  Orthostatics negative  · Suspect related to gastroenteritis, IV fluids as above  Repeat EKG in a m

## 2023-02-12 NOTE — DISCHARGE SUMMARY
Discharge Summary - [facility]    Patient Information: Cipriano Murcia 28 y o  male MRN: [id number]  Unit/Bed#: [facility] 710-01 Encounter: [id number]    Discharging Physician / Practitioner: Lakisha Hendricks PA-C  PCP: Rina Aggarwal MD  Admission Date: 2023  Discharge Date: 23    Reason for Admission: syncope, SIRS/espsis, gastroenteritis     Discharge Diagnoses:     Principal Problem:    Gastroenteritis  Active Problems:    Syncope  Resolved Problems:    * No resolved hospital problems  *      Consultations During Hospital Stay:  · None    Procedures Performed:   · CT abdomen pelvis with contrast  · ECG  · CBC  · CMP  · HS troponins  · Lactic  · Lipase  · Mag    Significant Findings:   · CT abdomen pelvis with contrast: "Multiple loops of prominent but nondilated fluid-filled small bowel throughout the abdomen and pelvis with a small amount of liquid stool within the colon, suggestive of a gastroenteritis/diarrhea  No evidence of obstruction  If symptoms persist, a repeat CT abdomen/pelvis after the administration of oral contrast could be performed for further evaluation  No free air or free fluid  Normal appendix visualized "  · ECG: sinus tachycardia, nonspecific T wave abnormality as confirmed by reading cardiologist   · Bands: 17  · HS troponins: 3, 3  · Lactic: 1 4  · Lipase: 109  · Ma 9    Incidental Findings:   · None     Test Results Pending at Discharge (will require follow up): · None     Outpatient Tests Requested:  · Follow up with PCP    Complications:  None    Hospital Course:     Cipriano Murcia is a 28 y o  male patient who originally presented to the hospital on 2023 due to nausea/vomiting, diarrhea, and brief syncopal episode  Patient reports that he ate pre-packaged rigatoni from [facility] on Friday night and woke up Saturday with abdominal pain, vomiting, diarrhea  His wife did not eat the rigatoni and has no symptoms   He has no known sick contacts, works from home, and denies recent antibiotic use  CT abdomen/pelvis with contrast on admission revealed, per the results report, "Multiple loops of prominent but nondilated fluid-filled small bowel throughout the abdomen and pelvis with a small amount of liquid stool within the colon, suggestive of a gastroenteritis/diarrhea  No evidence of obstruction  If symptoms persist, a repeat CT abdomen/pelvis after the administration of oral contrast could be performed for further evaluation  No free air or free fluid  Normal appendix visualized " Patient was given Tylenol, 2L IVF bolused, and placed on continuous IVF  ECG revealed sinus tachycardia with nonspecific T wave abnormality  HS troponins were negative x2  Orthostatic BPs negative  Suspect syncope related to gastroenteritis  Patient met SIRS vs sepsis criteria as evidenced by bandemia, fever, tachycardia in the setting of suspected gastroenteritis  Patient and wife are requesting discharge home  Patient's fever has resolved  He reports only 1 episode of diarrhea and then gas this AM  Denies vomiting  He is tolerating diet  He reports ambulating without difficulty here  Patient's wife is a nurse  Discussed strict return precautions with the patient and his wife  Recommend close outpatient follow up with the patient's PCP and will send them message for follow up  Case and plan discussed with [facility] attending, Dr Izzy Mueller, prior to discharge  Condition at Discharge: stable     Discharge Day Visit / Exam:     Subjective:    Mr Megan Pollack reports only 1 episode of diarrhea, non-bloody, here  He reports that his symptoms began Saturday morning after he ate a pre-packaged rigatoni after 9pm from [facility] the night before  Patient's wife did not eat this and has no symptoms  Denies sick contacts  Denies recent abx use  Afebrile this AM  He denies any further vomiting  He states that he feels better and wants to go home   Patient's wife is a nurse and reports that will monitor for fever, recurrent symptoms  Discussed strict return protocols with them  They want to go home    Vitals: Blood Pressure: 117/75 (02/12/23 0759)  Pulse: 74 (02/12/23 0759)  Temperature: 97 7 °F (36 5 °C) (02/12/23 0759)  Respirations: 17 (02/12/23 0759)  Weight - Scale: 125 kg (275 lb) (02/11/23 1624)  SpO2: 96 % (02/12/23 0759)  Exam:   Physical Exam  Vitals reviewed  Constitutional:       Comments: Patient seen sitting in bed, NAD, RN, patient's wife and father present  NAD   Cardiovascular:      Rate and Rhythm: Normal rate and regular rhythm  Pulmonary:      Effort: Pulmonary effort is normal  No respiratory distress  Breath sounds: Normal breath sounds  Abdominal:      General: Bowel sounds are normal  There is no distension  Palpations: Abdomen is soft  Tenderness: There is no abdominal tenderness  There is no guarding  Musculoskeletal:      Right lower leg: No edema  Left lower leg: No edema  Skin:     General: Skin is warm  Neurological:      Mental Status: He is alert and oriented to person, place, and time  Psychiatric:         Mood and Affect: Mood normal          Behavior: Behavior normal          Discharge instructions/Information to patient and family:   See after visit summary for information provided to patient and family  Provisions for Follow-Up Care:  See after visit summary for information related to follow-up care and any pertinent home health orders  Disposition:     Home    For Discharges to [facility]:   · Not Applicable to this Patient - Not Applicable to this Patient    Planned Readmission: None     Discharge Statement:  I spent 40 minutes discharging the patient  This time was spent on the day of discharge  I had direct contact with the patient on the day of discharge   Greater than 50% of the total time was spent examining patient, answering all patient questions, arranging and discussing plan of care with patient as well as directly providing post-discharge instructions  Additional time then spent on discharge activities  Discharge Medications:  See after visit summary for reconciled discharge medications provided to patient and family  ** Please Note: Dragon 360 Dictation voice to text software may have been used in the creation of this document   **

## 2023-02-13 ENCOUNTER — TRANSITIONAL CARE MANAGEMENT (OUTPATIENT)
Dept: FAMILY MEDICINE CLINIC | Facility: CLINIC | Age: 33
End: 2023-02-13

## 2023-02-14 ENCOUNTER — OFFICE VISIT (OUTPATIENT)
Dept: FAMILY MEDICINE CLINIC | Facility: CLINIC | Age: 33
End: 2023-02-14

## 2023-02-14 VITALS
BODY MASS INDEX: 35.45 KG/M2 | SYSTOLIC BLOOD PRESSURE: 112 MMHG | HEIGHT: 74 IN | RESPIRATION RATE: 16 BRPM | TEMPERATURE: 98.5 F | OXYGEN SATURATION: 97 % | WEIGHT: 276.2 LBS | DIASTOLIC BLOOD PRESSURE: 76 MMHG | HEART RATE: 71 BPM

## 2023-02-14 DIAGNOSIS — E78.2 MIXED HYPERLIPIDEMIA: ICD-10-CM

## 2023-02-14 DIAGNOSIS — R55 SYNCOPE, UNSPECIFIED SYNCOPE TYPE: ICD-10-CM

## 2023-02-14 DIAGNOSIS — R74.8 ELEVATED LIVER ENZYMES: ICD-10-CM

## 2023-02-14 DIAGNOSIS — Z09 HOSPITAL DISCHARGE FOLLOW-UP: Primary | ICD-10-CM

## 2023-02-14 DIAGNOSIS — Z01.89 ENCOUNTER FOR ROUTINE LABORATORY TESTING: ICD-10-CM

## 2023-02-14 DIAGNOSIS — K52.9 GASTROENTERITIS: ICD-10-CM

## 2023-02-14 NOTE — ASSESSMENT & PLAN NOTE
Due to dehydration from gastroenteritis  Feeling better after IV hydration   HR in 70's today  Continue oral hydration at home

## 2023-02-14 NOTE — PROGRESS NOTES
Assessment & Plan     1  Hospital discharge follow-up    2  Gastroenteritis  Assessment & Plan:  Improving   Continue soft diet and hydration   May advance as tolerated      3  Encounter for routine laboratory testing  -     Comprehensive metabolic panel  -     Lipid Panel with Direct LDL reflex; Future  -     Hemoglobin A1C  -     Comprehensive metabolic panel  -     CBC and differential    4  Mixed hyperlipidemia  -     Lipid Panel with Direct LDL reflex; Future    5  Elevated liver enzymes  Comments:  chronic since 2022  mostly due to fatty liver  weight loss discussed  repeat in 1 month  Orders:  -     Comprehensive metabolic panel    6  Syncope, unspecified syncope type  Assessment & Plan:  Due to dehydration from gastroenteritis  Feeling better after IV hydration   HR in 70's today  Continue oral hydration at home           Subjective     Transitional Care Management Review:   Lluvia Donahue is a 28 y o  male here for TCM follow up  During the TCM phone call patient stated:  TCM Call     Date and time call was made  2/13/2023 11:43 AM    Patient was hospitialized at  [facility]    Date of Admission  02/11/23    Date of discharge  02/12/23    Diagnosis  Gastroenteritis    Disposition  Home    Were the patients medications reviewed and updated  No      TCM Call     Should patient be enrolled in anticoag monitoring? No    Scheduled for follow up?   Yes    Did you obtain your prescribed medications  Yes    Do you need help managing your prescriptions or medications  No    Is transportation to your appointment needed  No    I have advised the patient to call PCP with any new or worsening symptoms  Gerhardt Erp, MR    Living Arrangements  Spouse or Significiant other    Are you recieving any outpatient services  No    Are you recieving home care services  No    Are you using any community resources  No    Current waiver services  No    Have you fallen in the last 12 months  No    Interperter language line needed  No        HPI   Here for follow up  Was admitted on 2/11/2023 for nausea/vomiting, diarrhea and syncopal episode  CT abdomen and pelvis showed gastroenteritis  No obstruction  Given IV fluids and sent home when he was able to tolerate PO intake  He is feeling better today   Only 1-2 episode of BM/day  since discharge which are more solid  Denies dizziness today  Able to keep food down    Review of Systems   Constitutional: Negative  HENT: Negative  Eyes: Negative  Respiratory: Negative  Cardiovascular: Negative  Gastrointestinal: Positive for diarrhea  Negative for abdominal pain, anal bleeding, blood in stool, constipation and rectal pain  Endocrine: Negative  Genitourinary: Negative  Musculoskeletal: Negative  Allergic/Immunologic: Negative  Hematological: Negative  Psychiatric/Behavioral: Negative  Objective     /76 (BP Location: Left arm, Patient Position: Sitting, Cuff Size: Large)   Pulse 71   Temp 98 5 °F (36 9 °C) (Tympanic)   Resp 16   Ht 6' 2 29" (1 887 m)   Wt 125 kg (276 lb 3 2 oz)   SpO2 97%   BMI 35 19 kg/m²      Physical Exam  Vitals and nursing note reviewed  Constitutional:       Appearance: Normal appearance  Cardiovascular:      Rate and Rhythm: Normal rate and regular rhythm  Pulses: Normal pulses  Heart sounds: Normal heart sounds  Pulmonary:      Effort: Pulmonary effort is normal       Breath sounds: Normal breath sounds  Abdominal:      General: Bowel sounds are normal  There is no distension  Palpations: There is no mass  Neurological:      General: No focal deficit present  Mental Status: He is alert and oriented to person, place, and time     Psychiatric:         Mood and Affect: Mood normal          Behavior: Behavior normal        Medications have been reviewed by provider in current encounter    Karen Berg MD

## 2023-03-09 ENCOUNTER — APPOINTMENT (OUTPATIENT)
Dept: LAB | Facility: CLINIC | Age: 33
End: 2023-03-09

## 2023-03-09 DIAGNOSIS — E78.2 MIXED HYPERLIPIDEMIA: ICD-10-CM

## 2023-03-09 DIAGNOSIS — Z01.89 ENCOUNTER FOR ROUTINE LABORATORY TESTING: ICD-10-CM

## 2023-03-09 LAB
BASOPHILS # BLD AUTO: 0.05 THOUSANDS/ÂΜL (ref 0–0.1)
BASOPHILS NFR BLD AUTO: 1 % (ref 0–1)
CHOLEST SERPL-MCNC: 267 MG/DL
EOSINOPHIL # BLD AUTO: 0.13 THOUSAND/ÂΜL (ref 0–0.61)
EOSINOPHIL NFR BLD AUTO: 2 % (ref 0–6)
ERYTHROCYTE [DISTWIDTH] IN BLOOD BY AUTOMATED COUNT: 12.6 % (ref 11.6–15.1)
EST. AVERAGE GLUCOSE BLD GHB EST-MCNC: 100 MG/DL
HBA1C MFR BLD: 5.1 %
HCT VFR BLD AUTO: 47.3 % (ref 36.5–49.3)
HDLC SERPL-MCNC: 37 MG/DL
HGB BLD-MCNC: 16.3 G/DL (ref 12–17)
IMM GRANULOCYTES # BLD AUTO: 0.01 THOUSAND/UL (ref 0–0.2)
IMM GRANULOCYTES NFR BLD AUTO: 0 % (ref 0–2)
LDLC SERPL CALC-MCNC: 187 MG/DL (ref 0–100)
LYMPHOCYTES # BLD AUTO: 2.61 THOUSANDS/ÂΜL (ref 0.6–4.47)
LYMPHOCYTES NFR BLD AUTO: 45 % (ref 14–44)
MCH RBC QN AUTO: 30.6 PG (ref 26.8–34.3)
MCHC RBC AUTO-ENTMCNC: 34.5 G/DL (ref 31.4–37.4)
MCV RBC AUTO: 89 FL (ref 82–98)
MONOCYTES # BLD AUTO: 0.46 THOUSAND/ÂΜL (ref 0.17–1.22)
MONOCYTES NFR BLD AUTO: 8 % (ref 4–12)
NEUTROPHILS # BLD AUTO: 2.61 THOUSANDS/ÂΜL (ref 1.85–7.62)
NEUTS SEG NFR BLD AUTO: 44 % (ref 43–75)
NRBC BLD AUTO-RTO: 0 /100 WBCS
PLATELET # BLD AUTO: 209 THOUSANDS/UL (ref 149–390)
PMV BLD AUTO: 9.7 FL (ref 8.9–12.7)
RBC # BLD AUTO: 5.33 MILLION/UL (ref 3.88–5.62)
TRIGL SERPL-MCNC: 215 MG/DL
WBC # BLD AUTO: 5.87 THOUSAND/UL (ref 4.31–10.16)

## 2023-03-13 ENCOUNTER — OFFICE VISIT (OUTPATIENT)
Dept: FAMILY MEDICINE CLINIC | Facility: CLINIC | Age: 33
End: 2023-03-13

## 2023-03-13 VITALS
RESPIRATION RATE: 16 BRPM | DIASTOLIC BLOOD PRESSURE: 74 MMHG | BODY MASS INDEX: 34.79 KG/M2 | HEIGHT: 75 IN | SYSTOLIC BLOOD PRESSURE: 122 MMHG | HEART RATE: 81 BPM | TEMPERATURE: 97.3 F | WEIGHT: 279.8 LBS | OXYGEN SATURATION: 97 %

## 2023-03-13 DIAGNOSIS — Z00.00 ANNUAL PHYSICAL EXAM: Primary | ICD-10-CM

## 2023-03-13 DIAGNOSIS — Z23 ENCOUNTER FOR IMMUNIZATION: ICD-10-CM

## 2023-03-13 DIAGNOSIS — E78.2 MIXED HYPERLIPIDEMIA: ICD-10-CM

## 2023-03-13 DIAGNOSIS — Z11.4 SCREENING FOR HIV (HUMAN IMMUNODEFICIENCY VIRUS): ICD-10-CM

## 2023-03-13 DIAGNOSIS — R74.8 ELEVATED LIVER ENZYMES: ICD-10-CM

## 2023-03-13 PROBLEM — K52.9 GASTROENTERITIS: Status: RESOLVED | Noted: 2023-02-11 | Resolved: 2023-03-13

## 2023-03-13 PROBLEM — R55 SYNCOPE: Status: RESOLVED | Noted: 2023-02-11 | Resolved: 2023-03-13

## 2023-03-13 NOTE — PROGRESS NOTES
[address] [facility] [address]    NAME: Priya Licona  AGE: 28 y o  SEX: male  : 1990     DATE: 3/13/2023     Assessment and Plan:   Kandice García was seen today for physical exam     Diagnoses and all orders for this visit:    Annual physical exam    Screening for HIV (human immunodeficiency virus)  -     HIV 1/2 AG/AB w Reflex [facility] for 2 yr old and above; Future    Mixed hyperlipidemia  Comments:  LDL >180   watch fatty fried food  trial of red yeast meal   to recheck in 6 months  Orders:  -     Lipid Panel with Direct LDL reflex; Future    Elevated liver enzymes  -     Comprehensive metabolic panel      Immunizations and preventive care screenings were discussed with patient today  Appropriate education was printed on patient's after visit summary  Counseling:  Alcohol/drug use: discussed moderation in alcohol intake, the recommendations for healthy alcohol use, and avoidance of illicit drug use  Dental Health: discussed importance of regular tooth brushing, flossing, and dental visits  Injury prevention: discussed safety/seat belts, safety helmets, smoke detectors, carbon dioxide detectors, and smoking near bedding or upholstery  Sexual health: discussed sexually transmitted diseases, partner selection, use of condoms, avoidance of unintended pregnancy, and contraceptive alternatives  Exercise: the importance of regular exercise/physical activity was discussed  Recommend exercise 3-5 times per week for at least 30 minutes  BMI Counseling: Body mass index is 35 31 kg/m²  The BMI is above normal  Nutrition recommendations include decreasing portion sizes and encouraging healthy choices of fruits and vegetables  Exercise recommendations include moderate physical activity 150 minutes/week  No pharmacotherapy was ordered  Rationale for BMI follow-up plan is due to patient being overweight or obese  No follow-ups on file       Chief Complaint:     Chief Complaint   Patient presents with   • Physical Exam     Patient is here today for an annual exam       History of Present Illness:     Adult Annual Physical   Patient here for a comprehensive physical exam  The patient reports no problems  Diet and Physical Activity  Diet/Nutrition: well balanced diet and consuming 3-5 servings of fruits/vegetables daily  Exercise: walking  Depression Screening  PHQ-2/9 Depression Screening         General Health  Sleep: sleeps well and gets 7-8 hours of sleep on average  Hearing: normal - bilateral   Vision: goes for regular eye exams  Dental: regular dental visits and brushes teeth twice daily   Health  History of STDs?: no      Review of Systems:     Review of Systems   Constitutional: Negative  HENT: Negative  Eyes: Negative  Respiratory: Negative  Cardiovascular: Negative  Gastrointestinal: Negative  Endocrine: Negative  Genitourinary: Negative  Musculoskeletal: Negative  Skin: Negative  Allergic/Immunologic: Negative  Neurological: Negative  Hematological: Negative  Psychiatric/Behavioral: Negative         Past Medical History:     Past Medical History:   Diagnosis Date   • Internal derangement of knee     Left - last assessed: Dec 22, 2015   • Syncope 2/11/2023      Past Surgical History:     Past Surgical History:   Procedure Laterality Date   • KNEE CARTILAGE SURGERY Left     2 surgeries   • TRICEPS TENDON RELEASE Left     tricep repair   • WISDOM TOOTH EXTRACTION        Social History:     Social History     Socioeconomic History   • Marital status: /Civil Union     Spouse name: None   • Number of children: None   • Years of education: None   • Highest education level: None   Occupational History   • None   Tobacco Use   • Smoking status: Never   • Smokeless tobacco: Never   Substance and Sexual Activity   • Alcohol use: Yes     Comment: social    • Drug use: Never   • Sexual activity: Yes     Partners: Female   Other Topics Concern   • None   Social History Narrative   • None     Social Determinants of Health     Financial Resource Strain: Not on file   Food Insecurity: Not on file   Transportation Needs: Not on file   Physical Activity: Not on file   Stress: Not on file   Social Connections: Not on file   Intimate Partner Violence: Not on file   Housing Stability: Not on file      Family History:     Family History   Problem Relation Age of Onset   • Breast cancer Mother    • Hodgkin's lymphoma Mother    • Heart defect Mother    • No Known Problems Father       Current Medications:     Current Outpatient Medications   Medication Sig Dispense Refill   • ondansetron (ZOFRAN-ODT) 4 mg disintegrating tablet Take 1 tablet (4 mg total) by mouth every 6 (six) hours as needed for nausea or vomiting 20 tablet 0     No current facility-administered medications for this visit  Allergies:     No Known Allergies   Physical Exam:     /74 (BP Location: Left arm, Patient Position: Sitting, Cuff Size: Large)   Pulse 81   Temp (!) 97 3 °F (36 3 °C) (Tympanic)   Resp 16   Ht 6' 2 65" (1 896 m)   Wt 127 kg (279 lb 12 8 oz)   SpO2 97%   BMI 35 31 kg/m²     Physical Exam  Vitals and nursing note reviewed  Constitutional:       Appearance: Normal appearance  He is well-developed  HENT:      Head: Normocephalic and atraumatic  Right Ear: Tympanic membrane normal       Left Ear: Tympanic membrane normal       Nose: Nose normal    Eyes:      Pupils: Pupils are equal, round, and reactive to light  Cardiovascular:      Rate and Rhythm: Normal rate and regular rhythm  Pulses: Normal pulses  Heart sounds: Normal heart sounds  Pulmonary:      Effort: Pulmonary effort is normal       Breath sounds: Normal breath sounds  Abdominal:      General: Abdomen is flat  Palpations: Abdomen is soft  Musculoskeletal:         General: Normal range of motion        Cervical back: Normal range of motion and neck supple  Skin:     General: Skin is warm  Capillary Refill: Capillary refill takes less than 2 seconds  Neurological:      Mental Status: He is alert and oriented to person, place, and time     Psychiatric:         Behavior: Behavior normal           Jose Angel Faria MD   [address] [facility]

## 2023-08-09 ENCOUNTER — OFFICE VISIT (OUTPATIENT)
Dept: FAMILY MEDICINE CLINIC | Facility: CLINIC | Age: 33
End: 2023-08-09
Payer: COMMERCIAL

## 2023-08-09 VITALS
RESPIRATION RATE: 18 BRPM | DIASTOLIC BLOOD PRESSURE: 90 MMHG | OXYGEN SATURATION: 99 % | WEIGHT: 279.4 LBS | BODY MASS INDEX: 35.86 KG/M2 | HEIGHT: 74 IN | HEART RATE: 94 BPM | TEMPERATURE: 97.1 F | SYSTOLIC BLOOD PRESSURE: 140 MMHG

## 2023-08-09 DIAGNOSIS — M79.675 PAIN OF TOE OF LEFT FOOT: Primary | ICD-10-CM

## 2023-08-09 PROCEDURE — 99213 OFFICE O/P EST LOW 20 MIN: CPT | Performed by: FAMILY MEDICINE

## 2023-08-09 RX ORDER — PREDNISONE 10 MG/1
TABLET ORAL
Qty: 20 TABLET | Refills: 0 | Status: SHIPPED | OUTPATIENT
Start: 2023-08-09

## 2023-08-09 RX ORDER — COLCHICINE 0.6 MG/1
CAPSULE ORAL
Qty: 20 CAPSULE | Refills: 0 | Status: SHIPPED | OUTPATIENT
Start: 2023-08-09 | End: 2023-08-10

## 2023-08-09 NOTE — PROGRESS NOTES
Name: Jose Box      : 1990      MRN: [id number]  Encounter Provider: Berle Alpers, MD  Encounter Date: 2023   Encounter department: [place] [address]. Pain of toe of left foot  Comments:  suspect gout  Orders:  -     Uric acid; Future  -     Basic metabolic panel; Future  -     C-reactive protein; Future  -     predniSONE 10 mg tablet; 4 tabs x 2 days, 3 tabs x 2 days, 2 tabs x 2 days, 1 tab x 2 days  -     Colchicine 0.6 MG CAPS; 1.2 mg x 1 then , 0.6 mg 1 hour later Do NOT Repeat x 3 days           Subjective      HPI   Woke up with left big toe pain 1 1/2 week ago   Ate red meat the night before   Tried ice, ibuprofen and cherry juice which took the edge off     Review of Systems   Constitutional: Negative. HENT: Negative. Eyes: Negative. Respiratory: Negative. Cardiovascular: Negative. Musculoskeletal: Positive for arthralgias and joint swelling. Negative for gait problem, myalgias, neck pain and neck stiffness. Psychiatric/Behavioral: Negative. Current Outpatient Medications on File Prior to Visit   Medication Sig   • ondansetron (ZOFRAN-ODT) 4 mg disintegrating tablet Take 1 tablet (4 mg total) by mouth every 6 (six) hours as needed for nausea or vomiting       Objective     /90 (BP Location: Left arm, Patient Position: Sitting, Cuff Size: Large)   Pulse 94   Temp (!) 97.1 °F (36.2 °C) (Tympanic)   Resp 18   Ht 6' 2.29" (1.887 m)   Wt 127 kg (279 lb 6.4 oz)   SpO2 99%   BMI 35.59 kg/m²     Physical Exam  Constitutional:       Appearance: Normal appearance. Cardiovascular:      Rate and Rhythm: Normal rate and regular rhythm. Pulses: Normal pulses. Heart sounds: Normal heart sounds. Pulmonary:      Effort: Pulmonary effort is normal.      Breath sounds: Normal breath sounds. Musculoskeletal:         General: Swelling and tenderness present.       Comments: Left 1st MTP joint   Neurological:      Mental Status: He is alert.           Fior Vidal MD

## 2023-08-10 ENCOUNTER — APPOINTMENT (OUTPATIENT)
Dept: LAB | Facility: CLINIC | Age: 33
End: 2023-08-10
Payer: COMMERCIAL

## 2023-08-10 DIAGNOSIS — E78.2 MIXED HYPERLIPIDEMIA: ICD-10-CM

## 2023-08-10 DIAGNOSIS — M79.675 PAIN OF TOE OF LEFT FOOT: ICD-10-CM

## 2023-08-10 DIAGNOSIS — Z11.4 SCREENING FOR HIV (HUMAN IMMUNODEFICIENCY VIRUS): ICD-10-CM

## 2023-08-10 LAB
ALBUMIN SERPL BCP-MCNC: 4.2 G/DL (ref 3.5–5)
ALP SERPL-CCNC: 84 U/L (ref 46–116)
ALT SERPL W P-5'-P-CCNC: 68 U/L (ref 12–78)
ANION GAP SERPL CALCULATED.3IONS-SCNC: 5 MMOL/L
AST SERPL W P-5'-P-CCNC: 19 U/L (ref 5–45)
BILIRUB SERPL-MCNC: 1.1 MG/DL (ref 0.2–1)
BUN SERPL-MCNC: 15 MG/DL (ref 5–25)
CALCIUM SERPL-MCNC: 9.7 MG/DL (ref 8.3–10.1)
CHLORIDE SERPL-SCNC: 111 MMOL/L (ref 96–108)
CHOLEST SERPL-MCNC: 254 MG/DL
CO2 SERPL-SCNC: 24 MMOL/L (ref 21–32)
CREAT SERPL-MCNC: 1.06 MG/DL (ref 0.6–1.3)
CRP SERPL QL: <3 MG/L
GFR SERPL CREATININE-BSD FRML MDRD: 91 ML/MIN/1.73SQ M
GLUCOSE P FAST SERPL-MCNC: 95 MG/DL (ref 65–99)
HDLC SERPL-MCNC: 39 MG/DL
HIV 1+2 AB+HIV1 P24 AG SERPL QL IA: NORMAL
HIV 2 AB SERPL QL IA: NORMAL
HIV1 AB SERPL QL IA: NORMAL
HIV1 P24 AG SERPL QL IA: NORMAL
LDLC SERPL CALC-MCNC: 178 MG/DL (ref 0–100)
POTASSIUM SERPL-SCNC: 3.9 MMOL/L (ref 3.5–5.3)
PROT SERPL-MCNC: 7.2 G/DL (ref 6.4–8.4)
SODIUM SERPL-SCNC: 140 MMOL/L (ref 135–147)
TRIGL SERPL-MCNC: 186 MG/DL
URATE SERPL-MCNC: 8.4 MG/DL (ref 3.5–8.5)

## 2023-08-10 PROCEDURE — 84550 ASSAY OF BLOOD/URIC ACID: CPT

## 2023-08-10 PROCEDURE — 80061 LIPID PANEL: CPT

## 2023-08-10 PROCEDURE — 86140 C-REACTIVE PROTEIN: CPT

## 2023-08-10 PROCEDURE — 87389 HIV-1 AG W/HIV-1&-2 AB AG IA: CPT

## 2023-08-10 PROCEDURE — 36415 COLL VENOUS BLD VENIPUNCTURE: CPT

## 2023-08-10 RX ORDER — COLCHICINE 0.6 MG/1
CAPSULE ORAL
Qty: 3 CAPSULE | Refills: 0 | Status: SHIPPED | OUTPATIENT
Start: 2023-08-10

## 2024-01-23 DIAGNOSIS — Z83.719 FAMILY HISTORY OF COLONIC POLYPS: Primary | ICD-10-CM

## 2024-01-30 ENCOUNTER — OFFICE VISIT (OUTPATIENT)
Dept: GASTROENTEROLOGY | Facility: CLINIC | Age: 34
End: 2024-01-30
Payer: COMMERCIAL

## 2024-01-30 ENCOUNTER — TELEPHONE (OUTPATIENT)
Dept: GASTROENTEROLOGY | Facility: CLINIC | Age: 34
End: 2024-01-30

## 2024-01-30 VITALS
HEIGHT: 74 IN | SYSTOLIC BLOOD PRESSURE: 132 MMHG | TEMPERATURE: 99.4 F | WEIGHT: 282 LBS | DIASTOLIC BLOOD PRESSURE: 84 MMHG | BODY MASS INDEX: 36.19 KG/M2

## 2024-01-30 DIAGNOSIS — Z83.719 FAMILY HISTORY OF COLONIC POLYPS: ICD-10-CM

## 2024-01-30 PROCEDURE — 99244 OFF/OP CNSLTJ NEW/EST MOD 40: CPT | Performed by: PHYSICIAN ASSISTANT

## 2024-01-30 NOTE — PATIENT INSTRUCTIONS
Scheduled date of colonoscopy (as of today):03/12/2024  Physician performing colonoscopy:Maksim  Location of colonoscopy: [facility]  Bowel prep reviewed with patient: Golytely / Dulcolax  Instructions reviewed with patient by: Hannah  Clearances:  NONE    Patient has a 6 month follow up on 09/30/2024

## 2024-01-30 NOTE — PROGRESS NOTES
[facility] - Outpatient Consultation  Odin Obrien 33 y.o. male MRN: [id number]  Encounter: [id number]          ASSESSMENT AND PLAN:      1. Family history of colonic polyps  - Ambulatory Referral to Gastroenterology  - Colonoscopy; Future  - polyethylene glycol (GOLYTELY) 4000 mL solution; Take 4,000 mL by mouth once for 1 dose  Dispense: 4000 mL; Refill: 0    ______________________________________________________________________    HPI:  Mando is a 32 y/o male who presents for colon cancer screening. He explains his brother who is only 2 years older than him was just found to have 2-3 precancerous polyps, so it was recommended that he undergo a colonoscopy at this time. Pt denies family hx of colon cancer, unintentional weight loss, fevers, chills, night sweats, abdominal pain, n/v, heartburn, dysphagia, bloody or black Bms.      REVIEW OF SYSTEMS:    CONSTITUTIONAL: Denies any fever, chills, rigors, and weight loss.  HEENT: No earache or tinnitus. Denies hearing loss or visual disturbances.  CARDIOVASCULAR: No chest pain or palpitations.   RESPIRATORY: Denies any cough, hemoptysis, shortness of breath or dyspnea on exertion.  GASTROINTESTINAL: As noted in the History of Present Illness.   GENITOURINARY: No problems with urination. Denies any hematuria or dysuria.  NEUROLOGIC: No dizziness or vertigo, denies headaches.   MUSCULOSKELETAL: Denies any muscle or joint pain.   SKIN: Denies skin rashes or itching.   ENDOCRINE: Denies excessive thirst. Denies intolerance to heat or cold.  PSYCHOSOCIAL: Denies depression or anxiety. Denies any recent memory loss.       Historical Information   Past Medical History:   Diagnosis Date    Internal derangement of knee     Left - last assessed: Dec 22, 2015    Syncope 2/11/2023     Past Surgical History:   Procedure Laterality Date    KNEE CARTILAGE SURGERY Left     2 surgeries    TRICEPS TENDON RELEASE Left     tricep repair    WISDOM TOOTH  "EXTRACTION       Social History   Social History     Substance and Sexual Activity   Alcohol Use Yes    Comment: social      Social History     Substance and Sexual Activity   Drug Use Never     Social History     Tobacco Use   Smoking Status Never   Smokeless Tobacco Never     Family History   Problem Relation Age of Onset    Breast cancer Mother     Hodgkin's lymphoma Mother     Heart defect Mother     No Known Problems Father        Meds/Allergies       Current Outpatient Medications:     Colchicine 0.6 MG CAPS    ondansetron (ZOFRAN-ODT) 4 mg disintegrating tablet    polyethylene glycol (GOLYTELY) 4000 mL solution    predniSONE 10 mg tablet    No Known Allergies        Objective     Blood pressure 132/84, temperature 99.4 °F (37.4 °C), temperature source Tympanic, height 6' 2.29\" (1.887 m), weight 128 kg (282 lb). Body mass index is 35.92 kg/m².        PHYSICAL EXAM:      General Appearance:   Alert, cooperative, no distress   HEENT:   Normocephalic, atraumatic, anicteric.     Neck:  Supple, symmetrical, trachea midline   Lungs:   Clear to auscultation bilaterally; no rales, rhonchi or wheezing; respirations unlabored    Heart::   Regular rate and rhythm; no murmur, rub, or gallop.   Abdomen:   Soft, non-tender, non-distended; normal bowel sounds; no masses, no organomegaly    Genitalia:   Deferred    Rectal:   Deferred    Extremities:  No cyanosis, clubbing or edema    Pulses:  2+ and symmetric    Skin:  No jaundice, rashes, or lesions    Lymph nodes:  No palpable cervical lymphadenopathy        Lab Results:   No visits with results within 1 Day(s) from this visit.   Latest known visit with results is:   Appointment on 08/10/2023   Component Date Value    HIV-1 p24 Antigen 08/10/2023 Non-Reactive     HIV-1 Antibody 08/10/2023 Non-Reactive     HIV-2 Antibody 08/10/2023 Non-Reactive     HIV Ag-Ab 5th Gen 08/10/2023 Non-Reactive     Cholesterol 08/10/2023 254 (H)     Triglycerides 08/10/2023 186 (H)     HDL, " Direct 08/10/2023 39 (L)     LDL Calculated 08/10/2023 178 (H)     Uric Acid 08/10/2023 8.4     CRP 08/10/2023 <3.0          Radiology Results:   No results found.

## 2024-02-27 ENCOUNTER — ANESTHESIA EVENT (OUTPATIENT)
Dept: ANESTHESIOLOGY | Facility: HOSPITAL | Age: 34
End: 2024-02-27

## 2024-02-27 ENCOUNTER — ANESTHESIA (OUTPATIENT)
Dept: ANESTHESIOLOGY | Facility: HOSPITAL | Age: 34
End: 2024-02-27

## 2024-03-12 ENCOUNTER — HOSPITAL ENCOUNTER (OUTPATIENT)
Dept: GASTROENTEROLOGY | Facility: AMBULARY SURGERY CENTER | Age: 34
Setting detail: OUTPATIENT SURGERY
Discharge: HOME | End: 2024-03-12
Payer: COMMERCIAL

## 2024-03-12 ENCOUNTER — ANESTHESIA EVENT (OUTPATIENT)
Dept: GASTROENTEROLOGY | Facility: AMBULARY SURGERY CENTER | Age: 34
End: 2024-03-12

## 2024-03-12 ENCOUNTER — ANESTHESIA (OUTPATIENT)
Dept: GASTROENTEROLOGY | Facility: AMBULARY SURGERY CENTER | Age: 34
End: 2024-03-12

## 2024-03-12 VITALS
HEIGHT: 75 IN | WEIGHT: 275 LBS | RESPIRATION RATE: 19 BRPM | SYSTOLIC BLOOD PRESSURE: 155 MMHG | TEMPERATURE: 97.5 F | DIASTOLIC BLOOD PRESSURE: 86 MMHG | HEART RATE: 77 BPM | BODY MASS INDEX: 34.19 KG/M2 | OXYGEN SATURATION: 98 %

## 2024-03-12 DIAGNOSIS — Z83.719 FAMILY HISTORY OF COLONIC POLYPS: ICD-10-CM

## 2024-03-12 RX ORDER — SODIUM CHLORIDE, SODIUM LACTATE, POTASSIUM CHLORIDE, CALCIUM CHLORIDE 600; 310; 30; 20 MG/100ML; MG/100ML; MG/100ML; MG/100ML
INJECTION, SOLUTION INTRAVENOUS CONTINUOUS PRN
Status: DISCONTINUED | OUTPATIENT
Start: 2024-03-12 | End: 2024-03-12

## 2024-03-12 RX ORDER — PROPOFOL 10 MG/ML
INJECTION, EMULSION INTRAVENOUS AS NEEDED
Status: DISCONTINUED | OUTPATIENT
Start: 2024-03-12 | End: 2024-03-12

## 2024-03-12 RX ADMIN — PROPOFOL 100 MG: 10 INJECTION, EMULSION INTRAVENOUS at 13:02

## 2024-03-12 RX ADMIN — PROPOFOL 50 MG: 10 INJECTION, EMULSION INTRAVENOUS at 13:16

## 2024-03-12 RX ADMIN — PROPOFOL 50 MG: 10 INJECTION, EMULSION INTRAVENOUS at 13:12

## 2024-03-12 RX ADMIN — SODIUM CHLORIDE, SODIUM LACTATE, POTASSIUM CHLORIDE, AND CALCIUM CHLORIDE: .6; .31; .03; .02 INJECTION, SOLUTION INTRAVENOUS at 12:52

## 2024-03-12 RX ADMIN — Medication 30 MG: at 13:08

## 2024-03-12 RX ADMIN — PROPOFOL 50 MG: 10 INJECTION, EMULSION INTRAVENOUS at 13:10

## 2024-03-12 RX ADMIN — PROPOFOL 50 MG: 10 INJECTION, EMULSION INTRAVENOUS at 13:06

## 2024-03-12 RX ADMIN — PROPOFOL 100 MG: 10 INJECTION, EMULSION INTRAVENOUS at 13:01

## 2024-03-12 RX ADMIN — PROPOFOL 50 MG: 10 INJECTION, EMULSION INTRAVENOUS at 13:19

## 2024-03-12 RX ADMIN — PROPOFOL 50 MG: 10 INJECTION, EMULSION INTRAVENOUS at 13:21

## 2024-03-12 RX ADMIN — PROPOFOL 50 MG: 10 INJECTION, EMULSION INTRAVENOUS at 13:14

## 2024-03-12 RX ADMIN — PROPOFOL 100 MG: 10 INJECTION, EMULSION INTRAVENOUS at 13:00

## 2024-03-12 RX ADMIN — PROPOFOL 30 MG: 10 INJECTION, EMULSION INTRAVENOUS at 13:23

## 2024-03-12 RX ADMIN — PROPOFOL 50 MG: 10 INJECTION, EMULSION INTRAVENOUS at 13:08

## 2024-03-12 RX ADMIN — PROPOFOL 50 MG: 10 INJECTION, EMULSION INTRAVENOUS at 13:04

## 2024-03-12 NOTE — ANESTHESIA PREPROCEDURE EVALUATION
"Procedure:  COLONOSCOPY    Relevant Problems   ANESTHESIA (within normal limits)      CARDIO   (+) Mixed hyperlipidemia      ENDO (within normal limits)      GI/HEPATIC (within normal limits)      /RENAL (within normal limits)      GYN (within normal limits)      HEMATOLOGY (within normal limits)      MUSCULOSKELETAL (within normal limits)      NEURO/PSYCH (within normal limits)      PULMONARY (within normal limits)      Lab Results   Component Value Date    WBC 5.87 03/09/2023    HGB 16.3 03/09/2023    HCT 47.3 03/09/2023    MCV 89 03/09/2023     03/09/2023     Lab Results   Component Value Date    SODIUM 140 08/10/2023    K 3.9 08/10/2023     (H) 08/10/2023    CO2 24 08/10/2023    AGAP 5 08/10/2023    BUN 15 08/10/2023    CREATININE 1.06 08/10/2023    GLUC 101 02/11/2023    GLUF 95 08/10/2023    CALCIUM 9.7 08/10/2023    AST 19 08/10/2023    ALT 68 08/10/2023    ALKPHOS 84 08/10/2023    TP 7.2 08/10/2023    TBILI 1.10 (H) 08/10/2023    EGFR 91 08/10/2023     No results found for: \"PTT\"  No results found for: \"INR\", \"PROTIME\"    Physical Exam    Airway    Mallampati score: II  TM Distance: >3 FB  Neck ROM: full     Dental   No notable dental hx     Cardiovascular  Rhythm: regular, Rate: normal, Cardiovascular exam normal    Pulmonary  Pulmonary exam normal Breath sounds clear to auscultation    Other Findings        Anesthesia Plan  ASA Score- 2     Anesthesia Type- IV sedation with anesthesia with ASA Monitors.         Additional Monitors:     Airway Plan:            Plan Factors-Exercise tolerance (METS): >4 METS.    Chart reviewed. EKG reviewed.  Existing labs reviewed. Patient summary reviewed.    Patient is not a current smoker.  Patient did not smoke on day of surgery.    Obstructive sleep apnea risk education given perioperatively.        Induction- intravenous.    Postoperative Plan-     Informed Consent- Anesthetic plan and risks discussed with patient.  I personally reviewed this patient " with the CRNA. Discussed and agreed on the Anesthesia Plan with the CRNA..

## 2024-03-12 NOTE — ANESTHESIA POSTPROCEDURE EVALUATION
Post-Op Assessment Note    CV Status:  Stable  Pain Score: 0    Pain management: adequate       Mental Status:  Alert and awake   Hydration Status:  Euvolemic   PONV Controlled:  Controlled   Airway Patency:  Patent     Post Op Vitals Reviewed: Yes    No anethesia notable event occurred.    Staff: CRNA               BP   104/55   Temp      Pulse  77   Resp   15   SpO2   97

## 2024-03-12 NOTE — H&P
"H&P EXAM - Outpatient Endoscopy   Odin Obrien 33 y.o. male MRN: [id number]    [facility]   Encounter: [id number]        History and Physical -  [facility]  Odin Obrien 33 y.o. male MRN: [id number]                  HPI: Odin Obrien is a 33 y.o. year old male who presents for colonoscopy for colon cancer screening. Reports family history of polyps.       REVIEW OF SYSTEMS: Per the HPI, and otherwise unremarkable.    Historical Information   Past Medical History:   Diagnosis Date    Gout     Internal derangement of knee     Left - last assessed: Dec 22, 2015    Syncope 02/11/2023     Past Surgical History:   Procedure Laterality Date    KNEE CARTILAGE SURGERY Left     2 surgeries    TRICEPS TENDON RELEASE Left     tricep repair    WISDOM TOOTH EXTRACTION       Social History   Social History     Substance and Sexual Activity   Alcohol Use Yes    Comment: social      Social History     Substance and Sexual Activity   Drug Use Never     Social History     Tobacco Use   Smoking Status Never   Smokeless Tobacco Never     Family History   Problem Relation Age of Onset    Breast cancer Mother     Hodgkin's lymphoma Mother     Heart defect Mother     No Known Problems Father        Meds/Allergies     (Not in a hospital admission)      No Known Allergies    Objective     /68   Pulse 73   Temp (!) 97.2 °F (36.2 °C) (Temporal)   Resp 16   Ht 6' 3\" (1.905 m)   Wt 125 kg (275 lb)   SpO2 98%   BMI 34.37 kg/m²       PHYSICAL EXAM    Gen: NAD  CV: RRR  CHEST: Clear  ABD: soft, NT/ND  EXT: no edema      ASSESSMENT/PLAN:  This is a 33 y.o. year old male here for colonoscopy, and he is stable and optimized for his procedure.     "

## 2024-03-15 ENCOUNTER — OFFICE VISIT (OUTPATIENT)
Dept: FAMILY MEDICINE CLINIC | Facility: CLINIC | Age: 34
End: 2024-03-15
Payer: COMMERCIAL

## 2024-03-15 VITALS
BODY MASS INDEX: 36.01 KG/M2 | DIASTOLIC BLOOD PRESSURE: 82 MMHG | HEIGHT: 74 IN | TEMPERATURE: 97.8 F | RESPIRATION RATE: 16 BRPM | OXYGEN SATURATION: 99 % | SYSTOLIC BLOOD PRESSURE: 130 MMHG | HEART RATE: 68 BPM | WEIGHT: 280.6 LBS

## 2024-03-15 DIAGNOSIS — R74.8 ELEVATED LIVER ENZYMES: ICD-10-CM

## 2024-03-15 DIAGNOSIS — Z00.00 ANNUAL PHYSICAL EXAM: Primary | ICD-10-CM

## 2024-03-15 DIAGNOSIS — E78.2 MIXED HYPERLIPIDEMIA: ICD-10-CM

## 2024-03-15 PROCEDURE — 99395 PREV VISIT EST AGE 18-39: CPT | Performed by: FAMILY MEDICINE

## 2024-03-15 NOTE — PROGRESS NOTES
ADULT ANNUAL PHYSICAL  [facility]    NAME: Odin Obrien  AGE: 33 y.o. SEX: male  : 1990     DATE: 3/15/2024     Assessment and Plan:     Problem List Items Addressed This Visit        Other    Elevated liver enzymes    Relevant Orders    Comprehensive metabolic panel    Mixed hyperlipidemia    Relevant Orders    Lipid panel   Other Visit Diagnoses     Annual physical exam    -  Primary    Relevant Orders    CBC and differential    Comprehensive metabolic panel    Hemoglobin A1C    TSH, 3rd generation with Free T4 reflex          Immunizations and preventive care screenings were discussed with patient today. Appropriate education was printed on patient's after visit summary.    Counseling:  Alcohol/drug use: discussed moderation in alcohol intake, the recommendations for healthy alcohol use, and avoidance of illicit drug use.  Dental Health: discussed importance of regular tooth brushing, flossing, and dental visits.  Injury prevention: discussed safety/seat belts, safety helmets, smoke detectors, carbon dioxide detectors, and smoking near bedding or upholstery.  Sexual health: discussed sexually transmitted diseases, partner selection, use of condoms, avoidance of unintended pregnancy, and contraceptive alternatives.  Exercise: the importance of regular exercise/physical activity was discussed. Recommend exercise 3-5 times per week for at least 30 minutes.          No follow-ups on file.     Chief Complaint:     Chief Complaint   Patient presents with   • Physical Exam     Patient being seen for Physical Exam      History of Present Illness:     Adult Annual Physical   Patient here for a comprehensive physical exam. The patient reports no problems.    Diet and Physical Activity  Diet/Nutrition: consuming 3-5 servings of fruits/vegetables daily.   Exercise: walking.      Depression Screening  PHQ-2/9 Depression Screening    Little interest or pleasure  in doing things: 0 - not at all  Feeling down, depressed, or hopeless: 0 - not at all  PHQ-2 Score: 0  PHQ-2 Interpretation: Negative depression screen       General Health  Sleep: sleeps well.   Hearing: normal - bilateral.  Vision: goes for regular eye exams.   Dental: regular dental visits.        Health  History of STDs?: no.    Advanced Care Planning  Do you have an advanced directive? no  Do you have a durable medical power of ? no  ACP document given to the patient? no     Review of Systems:     Review of Systems   Constitutional: Negative.    HENT: Negative.     Eyes: Negative.    Respiratory: Negative.     Cardiovascular: Negative.    Gastrointestinal: Negative.    Endocrine: Negative.    Genitourinary: Negative.    Musculoskeletal: Negative.    Skin: Negative.    Allergic/Immunologic: Negative.    Neurological: Negative.    Hematological: Negative.    Psychiatric/Behavioral: Negative.        Past Medical History:     Past Medical History:   Diagnosis Date   • Gout    • Internal derangement of knee     Left - last assessed: Dec 22, 2015   • Syncope 02/11/2023      Past Surgical History:     Past Surgical History:   Procedure Laterality Date   • KNEE CARTILAGE SURGERY Left     2 surgeries   • TRICEPS TENDON RELEASE Left     tricep repair   • WISDOM TOOTH EXTRACTION        Social History:     Social History     Socioeconomic History   • Marital status: /Civil Union     Spouse name: None   • Number of children: None   • Years of education: None   • Highest education level: None   Occupational History   • None   Tobacco Use   • Smoking status: Never   • Smokeless tobacco: Never   Vaping Use   • Vaping status: Never Used   Substance and Sexual Activity   • Alcohol use: Yes     Comment: social    • Drug use: Never   • Sexual activity: Yes     Partners: Female   Other Topics Concern   • None   Social History Narrative   • None     Social Determinants of Health     Financial Resource Strain: Not on  "file   Food Insecurity: Not on file   Transportation Needs: Not on file   Physical Activity: Not on file   Stress: Not on file   Social Connections: Not on file   Intimate Partner Violence: Not on file   Housing Stability: Not on file      Family History:     Family History   Problem Relation Age of Onset   • Breast cancer Mother    • Hodgkin's lymphoma Mother    • Heart defect Mother    • No Known Problems Father       Current Medications:     Current Outpatient Medications   Medication Sig Dispense Refill   • Colchicine 0.6 MG CAPS TAKE 2 CAPSULES (1.2 MG) X 1 THEN , TAKE 1 CAPSULE (0.6 MG) 1 HOUR LATER if needed for pain 3 capsule 0   • ondansetron (ZOFRAN-ODT) 4 mg disintegrating tablet Take 1 tablet (4 mg total) by mouth every 6 (six) hours as needed for nausea or vomiting 20 tablet 0     No current facility-administered medications for this visit.      Allergies:     No Known Allergies   Physical Exam:     /82 (BP Location: Left arm, Patient Position: Sitting, Cuff Size: Standard)   Pulse 68   Temp 97.8 °F (36.6 °C) (Tympanic)   Resp 16   Ht 6' 2.17\" (1.884 m)   Wt 127 kg (280 lb 9.6 oz)   SpO2 99%   BMI 35.86 kg/m²     Physical Exam  Vitals and nursing note reviewed.   Constitutional:       Appearance: Normal appearance. He is well-developed.   HENT:      Head: Normocephalic and atraumatic.      Nose: Nose normal.   Eyes:      Pupils: Pupils are equal, round, and reactive to light.   Cardiovascular:      Rate and Rhythm: Normal rate and regular rhythm.   Pulmonary:      Effort: Pulmonary effort is normal.      Breath sounds: Normal breath sounds.   Abdominal:      Palpations: Abdomen is soft.   Musculoskeletal:         General: Normal range of motion.      Cervical back: Normal range of motion and neck supple.   Skin:     General: Skin is warm.      Capillary Refill: Capillary refill takes less than 2 seconds.   Neurological:      Mental Status: He is alert and oriented to person, place, and " time.   Psychiatric:         Behavior: Behavior normal.          MD MANJINDER Gallardo [facility]

## 2024-06-05 ENCOUNTER — HOSPITAL ENCOUNTER (OUTPATIENT)
Dept: RADIOLOGY | Facility: HOSPITAL | Age: 34
Discharge: HOME | End: 2024-06-05
Payer: COMMERCIAL

## 2024-06-05 ENCOUNTER — HOSPITAL ENCOUNTER (OUTPATIENT)
Dept: RADIOLOGY | Age: 34
Discharge: HOME | End: 2024-06-05
Payer: COMMERCIAL

## 2024-06-05 ENCOUNTER — APPOINTMENT (OUTPATIENT)
Dept: LAB | Facility: CLINIC | Age: 34
End: 2024-06-05
Payer: COMMERCIAL

## 2024-06-05 ENCOUNTER — OFFICE VISIT (OUTPATIENT)
Dept: FAMILY MEDICINE CLINIC | Facility: CLINIC | Age: 34
End: 2024-06-05
Payer: COMMERCIAL

## 2024-06-05 VITALS
BODY MASS INDEX: 36.19 KG/M2 | HEART RATE: 77 BPM | SYSTOLIC BLOOD PRESSURE: 110 MMHG | RESPIRATION RATE: 16 BRPM | OXYGEN SATURATION: 100 % | TEMPERATURE: 98.7 F | HEIGHT: 74 IN | DIASTOLIC BLOOD PRESSURE: 70 MMHG | WEIGHT: 282 LBS

## 2024-06-05 DIAGNOSIS — R74.8 ELEVATED LIVER ENZYMES: Primary | ICD-10-CM

## 2024-06-05 DIAGNOSIS — E78.2 MIXED HYPERLIPIDEMIA: ICD-10-CM

## 2024-06-05 DIAGNOSIS — R07.1 CHEST PAIN ON BREATHING: Primary | ICD-10-CM

## 2024-06-05 DIAGNOSIS — R74.8 ELEVATED LIVER ENZYMES: ICD-10-CM

## 2024-06-05 DIAGNOSIS — R07.1 CHEST PAIN ON BREATHING: ICD-10-CM

## 2024-06-05 LAB
ALBUMIN SERPL BCP-MCNC: 4.5 G/DL (ref 3.5–5)
ALP SERPL-CCNC: 83 U/L (ref 34–104)
ALT SERPL W P-5'-P-CCNC: 277 U/L (ref 7–52)
ANION GAP SERPL CALCULATED.3IONS-SCNC: 6 MMOL/L (ref 4–13)
AST SERPL W P-5'-P-CCNC: 80 U/L (ref 13–39)
BASOPHILS # BLD AUTO: 0.04 THOUSANDS/ÂΜL (ref 0–0.1)
BASOPHILS NFR BLD AUTO: 1 % (ref 0–1)
BILIRUB SERPL-MCNC: 0.97 MG/DL (ref 0.2–1)
BUN SERPL-MCNC: 13 MG/DL (ref 5–25)
CALCIUM SERPL-MCNC: 9.7 MG/DL (ref 8.4–10.2)
CARDIAC TROPONIN I PNL SERPL HS: 3 NG/L (ref 8–18)
CHLORIDE SERPL-SCNC: 107 MMOL/L (ref 96–108)
CO2 SERPL-SCNC: 27 MMOL/L (ref 21–32)
CREAT SERPL-MCNC: 0.91 MG/DL (ref 0.6–1.3)
D DIMER PPP FEU-MCNC: <0.27 UG/ML FEU
EOSINOPHIL # BLD AUTO: 0.1 THOUSAND/ÂΜL (ref 0–0.61)
EOSINOPHIL NFR BLD AUTO: 2 % (ref 0–6)
ERYTHROCYTE [DISTWIDTH] IN BLOOD BY AUTOMATED COUNT: 13.2 % (ref 11.6–15.1)
GFR SERPL CREATININE-BSD FRML MDRD: 110 ML/MIN/1.73SQ M
GLUCOSE SERPL-MCNC: 80 MG/DL (ref 65–140)
HCT VFR BLD AUTO: 47.3 % (ref 36.5–49.3)
HGB BLD-MCNC: 16.3 G/DL (ref 12–17)
IMM GRANULOCYTES # BLD AUTO: 0 THOUSAND/UL (ref 0–0.2)
IMM GRANULOCYTES NFR BLD AUTO: 0 % (ref 0–2)
LYMPHOCYTES # BLD AUTO: 2.26 THOUSANDS/ÂΜL (ref 0.6–4.47)
LYMPHOCYTES NFR BLD AUTO: 47 % (ref 14–44)
MCH RBC QN AUTO: 30.4 PG (ref 26.8–34.3)
MCHC RBC AUTO-ENTMCNC: 34.5 G/DL (ref 31.4–37.4)
MCV RBC AUTO: 88 FL (ref 82–98)
MONOCYTES # BLD AUTO: 0.49 THOUSAND/ÂΜL (ref 0.17–1.22)
MONOCYTES NFR BLD AUTO: 10 % (ref 4–12)
NEUTROPHILS # BLD AUTO: 1.9 THOUSANDS/ÂΜL (ref 1.85–7.62)
NEUTS SEG NFR BLD AUTO: 40 % (ref 43–75)
NRBC BLD AUTO-RTO: 0 /100 WBCS
PLATELET # BLD AUTO: 202 THOUSANDS/UL (ref 149–390)
PMV BLD AUTO: 9.9 FL (ref 8.9–12.7)
POTASSIUM SERPL-SCNC: 4 MMOL/L (ref 3.5–5.3)
PROT SERPL-MCNC: 6.7 G/DL (ref 6.4–8.4)
RBC # BLD AUTO: 5.37 MILLION/UL (ref 3.88–5.62)
SODIUM SERPL-SCNC: 140 MMOL/L (ref 135–147)
WBC # BLD AUTO: 4.79 THOUSAND/UL (ref 4.31–10.16)

## 2024-06-05 PROCEDURE — 76700 US EXAM ABDOM COMPLETE: CPT

## 2024-06-05 PROCEDURE — 85025 COMPLETE CBC W/AUTO DIFF WBC: CPT | Performed by: FAMILY MEDICINE

## 2024-06-05 PROCEDURE — 99214 OFFICE O/P EST MOD 30 MIN: CPT | Performed by: FAMILY MEDICINE

## 2024-06-05 PROCEDURE — 85379 FIBRIN DEGRADATION QUANT: CPT

## 2024-06-05 PROCEDURE — 36415 COLL VENOUS BLD VENIPUNCTURE: CPT

## 2024-06-05 PROCEDURE — 80053 COMPREHEN METABOLIC PANEL: CPT | Performed by: FAMILY MEDICINE

## 2024-06-05 PROCEDURE — 71046 X-RAY EXAM CHEST 2 VIEWS: CPT

## 2024-06-05 PROCEDURE — 84484 ASSAY OF TROPONIN QUANT: CPT

## 2024-06-05 PROCEDURE — 93000 ELECTROCARDIOGRAM COMPLETE: CPT | Performed by: FAMILY MEDICINE

## 2024-06-05 NOTE — PROGRESS NOTES
Ambulatory Visit  Name: Odin Obrien      : 1990      MRN: [id number]  Encounter Provider: Melodie Squires MD  Encounter Date: 2024   Encounter department: [facility]    Assessment & Plan   1. Chest pain on breathing  Assessment & Plan:  Most likely muscle pain or pleuritic pain   ST -T waves abnormalities on EKG - no change from EKG done   Chest X ray ordered to r/o pneumothorax and pneumonia although its not very likely  Ordered troponin and D dimer  Stress echo ordered  To see cardiologist as well  Patient is to go to ER if he feels worse and patient verbalized understanding   Orders:  -     POCT ECG  -     XR chest pa & lateral; Future; Expected date: 2024  -     D-dimer, quantitative; Future  -     High Sensitivity Troponin I Random; Future  -     CBC and differential  -     Comprehensive metabolic panel  -     Echo stress test, exercise; Future; Expected date: 2024  -     Ambulatory Referral to Cardiology; Future  2. Mixed hyperlipidemia  Assessment & Plan:  He is working on diet and exercise  Will f/u labs  3. Elevated liver enzymes  Assessment & Plan:  Stable  Continue to monitor  Labs ordered         History of Present Illness     Lifted dumbell 50lbs each 7 days ago  Chest pain in his middle of the chest started 5 days ago and it lasted few seconds with inhalation and went away   Yesterday it happened again with Deep breath makes it worse and goes away    Chest Pain   This is a new problem. The current episode started in the past 7 days. The onset quality is sudden. The problem occurs intermittently. The pain is present in the substernal region. The quality of the pain is described as sharp. The pain does not radiate. Pertinent negatives include no abdominal pain, back pain, claudication, cough, diaphoresis, dizziness, exertional chest pressure, fever, headaches, hemoptysis, irregular heartbeat, leg pain, lower extremity edema, malaise/fatigue, nausea,  near-syncope, numbness, orthopnea, palpitations, PND, shortness of breath, sputum production, syncope, vomiting or weakness.     Review of Systems   Constitutional:  Negative for diaphoresis, fever and malaise/fatigue.   Respiratory:  Negative for cough, hemoptysis, sputum production and shortness of breath.    Cardiovascular:  Positive for chest pain. Negative for palpitations, orthopnea, claudication, syncope, PND and near-syncope.   Gastrointestinal:  Negative for abdominal pain, nausea and vomiting.   Musculoskeletal:  Negative for back pain.   Neurological:  Negative for dizziness, weakness, numbness and headaches.     Past Medical History:   Diagnosis Date   • Gout    • Internal derangement of knee     Left - last assessed: Dec 22, 2015   • Syncope 02/11/2023     Past Surgical History:   Procedure Laterality Date   • KNEE CARTILAGE SURGERY Left     2 surgeries   • TRICEPS TENDON RELEASE Left     tricep repair   • WISDOM TOOTH EXTRACTION       Family History   Problem Relation Age of Onset   • Breast cancer Mother    • Hodgkin's lymphoma Mother    • Heart defect Mother    • No Known Problems Father      Social History     Tobacco Use   • Smoking status: Never   • Smokeless tobacco: Never   Vaping Use   • Vaping status: Never Used   Substance and Sexual Activity   • Alcohol use: Yes     Comment: social    • Drug use: Never   • Sexual activity: Yes     Partners: Female     Current Outpatient Medications on File Prior to Visit   Medication Sig   • Colchicine 0.6 MG CAPS TAKE 2 CAPSULES (1.2 MG) X 1 THEN , TAKE 1 CAPSULE (0.6 MG) 1 HOUR LATER if needed for pain   • ondansetron (ZOFRAN-ODT) 4 mg disintegrating tablet Take 1 tablet (4 mg total) by mouth every 6 (six) hours as needed for nausea or vomiting     No Known Allergies  Immunization History   Administered Date(s) Administered   • COVID-19 MODERNA VACC 0.25 ML IM BOOSTER 12/14/2021   • COVID-19 MODERNA VACC 0.5 ML IM 03/12/2021, 04/09/2021   • DTP 1990,  "1990, 02/06/1991, 02/24/1992, 08/16/1995   • Hep B, Adolescent or Pediatric 08/28/1992, 10/09/1992, 10/08/1993   • Hepatitis A 05/17/1991, 11/01/1991   • HiB 02/06/1991, 05/17/1991, 11/01/1991   • INFLUENZA 02/02/2017, 11/28/2018, 11/08/2023   • Influenza Quadrivalent Preservative Free 3 years and older IM 02/02/2017   • Influenza, injectable, quadrivalent, preservative free 0.5 mL 11/28/2018, 12/06/2019, 03/03/2021, 03/09/2022, 03/13/2023   • MMR 10/01/1991, 08/16/1995, 07/26/2000   • Meningococcal MCV4P 06/27/2008   • OPV 1990, 1990, 02/24/1992, 08/16/1995   • Td (adult), adsorbed 08/11/2004   • Tdap 06/27/2008, 12/06/2019   • Tuberculin Skin Test-PPD Intradermal 1990, 1990, 02/06/1991, 08/28/1992, 08/16/1995   • Varicella 08/18/1995, 09/13/1996     Objective     /70 (BP Location: Left arm, Patient Position: Sitting, Cuff Size: Standard)   Pulse 77   Temp 98.7 °F (37.1 °C) (Tympanic)   Resp 16   Ht 6' 2.17\" (1.884 m)   Wt 128 kg (282 lb)   SpO2 100%   BMI 36.04 kg/m²     Physical Exam  Constitutional:       Appearance: Normal appearance.   Eyes:      Extraocular Movements: Extraocular movements intact.      Pupils: Pupils are equal, round, and reactive to light.   Cardiovascular:      Rate and Rhythm: Normal rate and regular rhythm.      Pulses: Normal pulses.      Heart sounds: Normal heart sounds.   Pulmonary:      Effort: Pulmonary effort is normal.      Breath sounds: Normal breath sounds.   Abdominal:      General: There is no distension.      Palpations: Abdomen is soft. There is no mass.   Musculoskeletal:         General: No swelling.      Right lower leg: No edema.      Left lower leg: No edema.   Neurological:      General: No focal deficit present.      Mental Status: He is alert.   Psychiatric:         Mood and Affect: Mood normal.         Behavior: Behavior normal.       Administrative Statements   I have spent a total time of 30 minutes on 06/05/24 In caring " for this patient including Risks and benefits of tx options, Instructions for management, and Counseling / Coordination of care.

## 2024-06-05 NOTE — ASSESSMENT & PLAN NOTE
Most likely muscle pain or pleuritic pain   ST -T waves abnormalities on EKG - no change from EKG done 2023  Chest X ray ordered to r/o pneumothorax and pneumonia although its not very likely  Ordered troponin and D dimer  Stress echo ordered  To see cardiologist as well  Patient is to go to ER if he feels worse and patient verbalized understanding

## 2024-06-07 ENCOUNTER — HOSPITAL ENCOUNTER (OUTPATIENT)
Dept: NON INVASIVE DIAGNOSTICS | Facility: HOSPITAL | Age: 34
Discharge: HOME | End: 2024-06-07
Payer: COMMERCIAL

## 2024-06-07 VITALS
OXYGEN SATURATION: 98 % | WEIGHT: 282 LBS | BODY MASS INDEX: 36.19 KG/M2 | DIASTOLIC BLOOD PRESSURE: 92 MMHG | SYSTOLIC BLOOD PRESSURE: 142 MMHG | HEART RATE: 65 BPM | HEIGHT: 74 IN

## 2024-06-07 DIAGNOSIS — R07.1 CHEST PAIN ON BREATHING: ICD-10-CM

## 2024-06-07 LAB
BASELINE ST ELEVATION: 0 MM
E WAVE DECELERATION TIME: 240 MS
E/A RATIO: 1.61
MAX HR PERCENT: 95 %
MAX HR: 179 BPM
MV E'TISSUE VEL-LAT: 15 CM/S
MV E'TISSUE VEL-SEP: 11 CM/S
MV PEAK A VEL: 0.46 M/S
MV PEAK E VEL: 74 CM/S
MV STENOSIS PRESSURE HALF TIME: 70 MS
MV VALVE AREA P 1/2 METHOD: 3.14
RATE PRESSURE PRODUCT: NORMAL
SL CV LV EF: 60
SL CV STRESS RECOVERY BP: NORMAL MMHG
SL CV STRESS RECOVERY HR: 106 BPM
SL CV STRESS RECOVERY O2 SAT: 98 %
SL CV STRESS STAGE REACHED: 4
STRESS ANGINA INDEX: 0
STRESS BASELINE BP: NORMAL MMHG
STRESS BASELINE HR: 65 BPM
STRESS DUKE TREADMILL SCORE: 11
STRESS O2 SAT REST: 98 %
STRESS PEAK HR: 159 BPM
STRESS POST ESTIMATED WORKLOAD: 13.4 METS
STRESS POST EXERCISE DUR MIN: 11 MIN
STRESS POST EXERCISE DUR SEC: 0 SEC
STRESS POST O2 SAT PEAK: 98 %
STRESS POST PEAK BP: 198 MMHG
STRESS ST ELEVATION: 0 MM

## 2024-06-07 PROCEDURE — 93350 STRESS TTE ONLY: CPT

## 2024-06-07 PROCEDURE — 93350 STRESS TTE ONLY: CPT | Performed by: INTERNAL MEDICINE

## 2024-06-08 LAB
CHEST PAIN STATEMENT: NORMAL
MAX DIASTOLIC BP: 84 MMHG
MAX PREDICTED HEART RATE: 187 BPM
PROTOCOL NAME: NORMAL
REASON FOR TERMINATION: NORMAL
STRESS POST EXERCISE DUR MIN: 11 MIN
STRESS POST EXERCISE DUR SEC: 0 SEC
STRESS POST PEAK HR: 179 BPM
STRESS POST PEAK SYSTOLIC BP: 198 MMHG
TARGET HR FORMULA: NORMAL
TEST INDICATION: NORMAL

## 2024-07-26 ENCOUNTER — APPOINTMENT (OUTPATIENT)
Dept: LAB | Facility: CLINIC | Age: 34
End: 2024-07-26
Payer: COMMERCIAL

## 2024-07-26 DIAGNOSIS — K76.0 FATTY LIVER: ICD-10-CM

## 2024-07-26 DIAGNOSIS — Z00.8 HEALTH EXAMINATION IN POPULATION SURVEY: ICD-10-CM

## 2024-07-26 DIAGNOSIS — R74.8 ELEVATED LIVER ENZYMES: Primary | ICD-10-CM

## 2024-07-26 LAB
ALBUMIN SERPL BCG-MCNC: 4.3 G/DL (ref 3.5–5)
ALP SERPL-CCNC: 69 U/L (ref 34–104)
ALT SERPL W P-5'-P-CCNC: 211 U/L (ref 7–52)
ANION GAP SERPL CALCULATED.3IONS-SCNC: 5 MMOL/L (ref 4–13)
AST SERPL W P-5'-P-CCNC: 65 U/L (ref 13–39)
BASOPHILS # BLD AUTO: 0.04 THOUSANDS/ÂΜL (ref 0–0.1)
BASOPHILS NFR BLD AUTO: 1 % (ref 0–1)
BILIRUB SERPL-MCNC: 1.12 MG/DL (ref 0.2–1)
BUN SERPL-MCNC: 12 MG/DL (ref 5–25)
CALCIUM SERPL-MCNC: 9.3 MG/DL (ref 8.4–10.2)
CHLORIDE SERPL-SCNC: 106 MMOL/L (ref 96–108)
CHOLEST SERPL-MCNC: 244 MG/DL
CO2 SERPL-SCNC: 27 MMOL/L (ref 21–32)
CREAT SERPL-MCNC: 0.94 MG/DL (ref 0.6–1.3)
EOSINOPHIL # BLD AUTO: 0.1 THOUSAND/ÂΜL (ref 0–0.61)
EOSINOPHIL NFR BLD AUTO: 2 % (ref 0–6)
ERYTHROCYTE [DISTWIDTH] IN BLOOD BY AUTOMATED COUNT: 13.1 % (ref 11.6–15.1)
EST. AVERAGE GLUCOSE BLD GHB EST-MCNC: 100 MG/DL
GFR SERPL CREATININE-BSD FRML MDRD: 106 ML/MIN/1.73SQ M
GLUCOSE P FAST SERPL-MCNC: 87 MG/DL (ref 65–99)
HBA1C MFR BLD: 5.1 %
HCT VFR BLD AUTO: 46.3 % (ref 36.5–49.3)
HDLC SERPL-MCNC: 40 MG/DL
HGB BLD-MCNC: 16.2 G/DL (ref 12–17)
IMM GRANULOCYTES # BLD AUTO: 0.01 THOUSAND/UL (ref 0–0.2)
IMM GRANULOCYTES NFR BLD AUTO: 0 % (ref 0–2)
LDLC SERPL CALC-MCNC: 187 MG/DL (ref 0–100)
LYMPHOCYTES # BLD AUTO: 2.45 THOUSANDS/ÂΜL (ref 0.6–4.47)
LYMPHOCYTES NFR BLD AUTO: 47 % (ref 14–44)
MCH RBC QN AUTO: 30.7 PG (ref 26.8–34.3)
MCHC RBC AUTO-ENTMCNC: 35 G/DL (ref 31.4–37.4)
MCV RBC AUTO: 88 FL (ref 82–98)
MONOCYTES # BLD AUTO: 0.51 THOUSAND/ÂΜL (ref 0.17–1.22)
MONOCYTES NFR BLD AUTO: 10 % (ref 4–12)
NEUTROPHILS # BLD AUTO: 2.07 THOUSANDS/ÂΜL (ref 1.85–7.62)
NEUTS SEG NFR BLD AUTO: 40 % (ref 43–75)
NONHDLC SERPL-MCNC: 204 MG/DL
NRBC BLD AUTO-RTO: 0 /100 WBCS
PLATELET # BLD AUTO: 197 THOUSANDS/UL (ref 149–390)
PMV BLD AUTO: 9.9 FL (ref 8.9–12.7)
POTASSIUM SERPL-SCNC: 4.5 MMOL/L (ref 3.5–5.3)
PROT SERPL-MCNC: 6.6 G/DL (ref 6.4–8.4)
RBC # BLD AUTO: 5.28 MILLION/UL (ref 3.88–5.62)
SODIUM SERPL-SCNC: 138 MMOL/L (ref 135–147)
TRIGL SERPL-MCNC: 86 MG/DL
TSH SERPL DL<=0.05 MIU/L-ACNC: 1.72 UIU/ML (ref 0.45–4.5)
WBC # BLD AUTO: 5.18 THOUSAND/UL (ref 4.31–10.16)

## 2024-09-30 ENCOUNTER — OFFICE VISIT (OUTPATIENT)
Dept: GASTROENTEROLOGY | Facility: CLINIC | Age: 34
End: 2024-09-30
Payer: COMMERCIAL

## 2024-09-30 VITALS
DIASTOLIC BLOOD PRESSURE: 78 MMHG | TEMPERATURE: 97.5 F | BODY MASS INDEX: 35.29 KG/M2 | WEIGHT: 275 LBS | SYSTOLIC BLOOD PRESSURE: 120 MMHG | HEIGHT: 74 IN

## 2024-09-30 DIAGNOSIS — R74.01 TRANSAMINITIS: Primary | ICD-10-CM

## 2024-09-30 DIAGNOSIS — K76.0 FATTY LIVER: ICD-10-CM

## 2024-09-30 PROCEDURE — 99214 OFFICE O/P EST MOD 30 MIN: CPT | Performed by: PHYSICIAN ASSISTANT

## 2024-09-30 NOTE — PROGRESS NOTES
[facility] - Outpatient Follow-up Note  Odin Obrien 34 y.o. male MRN: [id number]  Encounter: [id number]          ASSESSMENT AND PLAN:      Mando is a 35 y/o male who presents for follow-up.     Transaminitis  Fatty liver   ALT has been elevated since 2022 at least but as of this past summer, AST is mildly elevated, as well. Blood work from June 2024: AST 80,  with repeat blood work 7/26/2024 noting: AST 65 and , both down but still elevated. AP and T.Bili WNL. Pt underwent partial liver testing 4/2022 per his PCP with ceruloplasmin, AMA, ASMA, acute hepatitis, and chronic hep panel WNL. Abdominal u/s this past June noted fatty liver. Pt says he uses tylenol and NSAIDs sparingly, but has stopped since he spoke to his PCP last. He says he drinks 2-4 alcoholic beverages once every few weeks. He denies drug use. He denies OTC supplements.   -explained to pt that this could be from fatty infiltration of the liver but we also would like to rule out any further autoimmune etiology and iron overload  -MADONNA, liver/kidney, IGG, iron panel, celiac panel all ordered for completely  -GARCIA fibrosure ordered   -Liver u/s with doppler ordered   -healthy diet, exercise, slow but sustained weight loss of about 10% body weight discussed   ______________________________________________________________________    SUBJECTIVE:  Mando is a 35 y/o male who presents for follow-up. Pt denies abdominal pain, n/v, heartburn, unintentional weight loss, fevers, chills, night sweats, constipation, diarrhea, NSAID use, bloody or black BM.       REVIEW OF SYSTEMS IS OTHERWISE NEGATIVE.      Historical Information   Past Medical History:   Diagnosis Date    Gout     Internal derangement of knee     Left - last assessed: Dec 22, 2015    Syncope 02/11/2023     Past Surgical History:   Procedure Laterality Date    KNEE CARTILAGE SURGERY Left     2 surgeries    TRICEPS TENDON RELEASE Left     tricep repair     WISDOM TOOTH EXTRACTION       Social History   Social History     Substance and Sexual Activity   Alcohol Use Yes    Comment: social      Social History     Substance and Sexual Activity   Drug Use Never     Social History     Tobacco Use   Smoking Status Never   Smokeless Tobacco Never     Family History   Problem Relation Age of Onset    Breast cancer Mother     Hodgkin's lymphoma Mother     Heart defect Mother     No Known Problems Father        Meds/Allergies       Current Outpatient Medications:     Colchicine 0.6 MG CAPS    ondansetron (ZOFRAN-ODT) 4 mg disintegrating tablet    No Known Allergies        Objective     There were no vitals taken for this visit. There is no height or weight on file to calculate BMI.      PHYSICAL EXAM:      General Appearance:   Alert, cooperative, no distress   HEENT:   Normocephalic, atraumatic, anicteric.     Neck:  Supple, symmetrical, trachea midline   Lungs:   Clear to auscultation bilaterally; no rales, rhonchi or wheezing; respirations unlabored    Heart::   Regular rate and rhythm; no murmur, rub, or gallop.   Abdomen:   Soft, non-tender, non-distended; normal bowel sounds; no masses, no organomegaly    Genitalia:   Deferred    Rectal:   Deferred    Extremities:  No cyanosis, clubbing or edema    Pulses:  2+ and symmetric    Skin:  No jaundice, rashes, or lesions    Lymph nodes:  No palpable cervical lymphadenopathy        Lab Results:   No visits with results within 1 Day(s) from this visit.   Latest known visit with results is:   Hospital Outpatient Visit on 06/07/2024   Component Date Value    MV Peak A Juan Diego 06/07/2024 0.46     MV stenosis pressure 1/2* 06/07/2024 70     MV Peak E Juan Diego 06/07/2024 74     E wave deceleration time 06/07/2024 240     E/A ratio 06/07/2024 1.61     MV valve area p 1/2 meth* 06/07/2024 3.14     MV E' Tissue Velocity La* 06/07/2024 15     MV E' Tissue Velocity Se* 06/07/2024 11     Baseline HR 06/07/2024 65     Baseline BP 06/07/2024 142/96      O2 sat rest 06/07/2024 98     Stress peak HR 06/07/2024 159     Post peak BP 06/07/2024 198     O2 sat peak 06/07/2024 98     Recovery HR 06/07/2024 106     Recovery BP 06/07/2024 124/68     O2 sat recovery 06/07/2024 98     Max HR 06/07/2024 179     Max HR Percent 06/07/2024 95     Exercise duration (min) 06/07/2024 11     Exercise duration (sec) 06/07/2024 0     Estimated workload 06/07/2024 13.4     Rate Pressure Product 06/07/2024 31,482.0     Angina Index 06/07/2024 0     Stress Stage Reached 06/07/2024 4.0     LV EF 06/07/2024 60     Base ST Elevation (mm) 06/07/2024 0.0     Zhang Treadmill Score 06/07/2024 11     ST Elevation (mm) 06/07/2024 0.0     Protocol Name 06/07/2024 STEPHAN     Exercise duration (min) 06/07/2024 11     Exercise duration (sec) 06/07/2024 0     Post Peak Systolic BP 06/07/2024 198     Max Diastolic Bp 06/07/2024 84     Peak HR 06/07/2024 179     Max Predicted Heart Rate 06/07/2024 187     Reason for Termination 06/07/2024 Fatigue     Test Indication 06/07/2024 Chest Discomfort     Target Hr Formular 06/07/2024 (220 - Age)*100%     Chest Pain Statement 06/07/2024 none          Radiology Results:   No results found.

## 2024-09-30 NOTE — PATIENT INSTRUCTIONS
Patient was scheduled for US 10/01/2024 At [facility] @9:30 am  Cancelled appointment with FELIX Post to schedule with Diane at Hepatology    Follow up as needed or instructed

## 2024-10-01 ENCOUNTER — APPOINTMENT (OUTPATIENT)
Dept: LAB | Facility: CLINIC | Age: 34
End: 2024-10-01
Payer: COMMERCIAL

## 2024-10-01 ENCOUNTER — HOSPITAL ENCOUNTER (OUTPATIENT)
Dept: ULTRASOUND IMAGING | Facility: HOSPITAL | Age: 34
Discharge: HOME | End: 2024-10-01
Payer: COMMERCIAL

## 2024-10-01 DIAGNOSIS — R74.01 TRANSAMINITIS: ICD-10-CM

## 2024-10-01 LAB
ALBUMIN SERPL BCG-MCNC: 4.4 G/DL (ref 3.5–5)
ALP SERPL-CCNC: 70 U/L (ref 34–104)
ALT SERPL W P-5'-P-CCNC: 129 U/L (ref 7–52)
ANA SER QL IA: NEGATIVE
AST SERPL W P-5'-P-CCNC: 46 U/L (ref 13–39)
BILIRUB DIRECT SERPL-MCNC: 0.24 MG/DL (ref 0–0.2)
BILIRUB SERPL-MCNC: 1.08 MG/DL (ref 0.2–1)
FERRITIN SERPL-MCNC: 235 NG/ML (ref 24–336)
GLIADIN PEPTIDE IGA SER-ACNC: <0.2 U/ML
GLIADIN PEPTIDE IGA SER-ACNC: NEGATIVE
GLIADIN PEPTIDE IGG SER-ACNC: 0.4 U/ML
GLIADIN PEPTIDE IGG SER-ACNC: NEGATIVE
IGA SERPL-MCNC: 123 MG/DL (ref 66–433)
IGA SERPL-MCNC: 123 MG/DL (ref 66–433)
IGG SERPL-MCNC: 740 MG/DL (ref 635–1741)
IGM SERPL-MCNC: 90 MG/DL (ref 45–281)
IRON SATN MFR SERPL: 49 % (ref 15–50)
IRON SERPL-MCNC: 175 UG/DL (ref 50–212)
PROT SERPL-MCNC: 6.7 G/DL (ref 6.4–8.4)
TIBC SERPL-MCNC: 356 UG/DL (ref 250–450)
TTG IGA SER-ACNC: <0.5 U/ML
TTG IGA SER-ACNC: NEGATIVE
TTG IGG SER-ACNC: <0.8 U/ML
TTG IGG SER-ACNC: NEGATIVE
UIBC SERPL-MCNC: 181 UG/DL (ref 155–355)

## 2024-10-01 PROCEDURE — 86376 MICROSOMAL ANTIBODY EACH: CPT

## 2024-10-01 PROCEDURE — 82465 ASSAY BLD/SERUM CHOLESTEROL: CPT

## 2024-10-01 PROCEDURE — 82728 ASSAY OF FERRITIN: CPT

## 2024-10-01 PROCEDURE — 82247 BILIRUBIN TOTAL: CPT

## 2024-10-01 PROCEDURE — 86364 TISS TRNSGLTMNASE EA IG CLAS: CPT

## 2024-10-01 PROCEDURE — 82977 ASSAY OF GGT: CPT

## 2024-10-01 PROCEDURE — 84460 ALANINE AMINO (ALT) (SGPT): CPT

## 2024-10-01 PROCEDURE — 82784 ASSAY IGA/IGD/IGG/IGM EACH: CPT

## 2024-10-01 PROCEDURE — 83883 ASSAY NEPHELOMETRY NOT SPEC: CPT

## 2024-10-01 PROCEDURE — 84478 ASSAY OF TRIGLYCERIDES: CPT

## 2024-10-01 PROCEDURE — 84450 TRANSFERASE (AST) (SGOT): CPT

## 2024-10-01 PROCEDURE — 36415 COLL VENOUS BLD VENIPUNCTURE: CPT

## 2024-10-01 PROCEDURE — 83010 ASSAY OF HAPTOGLOBIN QUANT: CPT

## 2024-10-01 PROCEDURE — 83540 ASSAY OF IRON: CPT

## 2024-10-01 PROCEDURE — 82947 ASSAY GLUCOSE BLOOD QUANT: CPT

## 2024-10-01 PROCEDURE — 76705 ECHO EXAM OF ABDOMEN: CPT

## 2024-10-01 PROCEDURE — 86258 DGP ANTIBODY EACH IG CLASS: CPT

## 2024-10-01 PROCEDURE — 80076 HEPATIC FUNCTION PANEL: CPT

## 2024-10-01 PROCEDURE — 82172 ASSAY OF APOLIPOPROTEIN: CPT

## 2024-10-01 PROCEDURE — 86038 ANTINUCLEAR ANTIBODIES: CPT

## 2024-10-01 PROCEDURE — 83550 IRON BINDING TEST: CPT

## 2024-10-02 LAB — LKM-1 AB SER-ACNC: <20.1 UNITS (ref 0–20)

## 2024-10-04 ENCOUNTER — TELEPHONE (OUTPATIENT)
Dept: GASTROENTEROLOGY | Facility: CLINIC | Age: 34
End: 2024-10-04

## 2024-10-04 LAB
A2 MACROGLOB SERPL-MCNC: 111 MG/DL (ref 110–276)
ALT SERPL W P-5'-P-CCNC: 161 IU/L (ref 0–55)
APO A-I SERPL-MCNC: 131 MG/DL (ref 101–178)
AST SERPL W P-5'-P-CCNC: 60 IU/L (ref 0–40)
BILIRUB SERPL-MCNC: 0.6 MG/DL (ref 0–1.2)
CHOLEST SERPL-MCNC: 254 MG/DL (ref 100–199)
FIBROSIS SCORING:: ABNORMAL
FIBROSIS STAGE SERPL QL: ABNORMAL
GGT SERPL-CCNC: 76 IU/L (ref 0–65)
GLUCOSE SERPL-MCNC: 81 MG/DL (ref 70–99)
HAPTOGLOB SERPL-MCNC: 68 MG/DL (ref 17–317)
INTERPRETATION: ABNORMAL
LABORATORY COMMENT REPORT: ABNORMAL
LIVER FIBR SCORE SERPL CALC.FIBROSURE: 0.15 (ref 0–0.21)
NASH GRADE SERPL QL: ABNORMAL
NASH SCORE SERPL: 0.61 (ref 0–0.25)
REF LAB TEST METHOD: ABNORMAL
SL AMB NASH SCORING: ABNORMAL
SL AMB STEATOSIS GRADE: ABNORMAL
SL AMB STEATOSIS SCORE: 0.69 (ref 0–0.4)
STEATOSIS SCORING: ABNORMAL
TEST PERFORMANCE INFO SPEC: ABNORMAL
TRIGL SERPL-MCNC: 141 MG/DL (ref 0–149)

## 2024-10-04 NOTE — TELEPHONE ENCOUNTER
----- Message from Gemma Caban PA-C sent at 10/4/2024 12:46 PM EDT -----  Please let patient know that the [facility] FibroSure blood test did depict moderate- severe fatty liver.  This can certainly be the cause of his elevated liver enzymes, so he should follow-up with hepatology still.  In the meantime, he should maintain avoiding any alcohol or substance use, maintain healthy diet, exercise and weight loss.  There are no signs of scarring on his testing as of now, however avoiding progression of his fatty liver is what we recommend in order to prevent any scarring/fibrosis in the future (cirrhosis). Thanks!

## 2024-10-04 NOTE — TELEPHONE ENCOUNTER
I spoke to patient he is now aware of the results and will work on dieting and exercise and keep his appt with hepatology. Patient has no further questions at this time. thank you

## 2024-10-30 ENCOUNTER — OFFICE VISIT (OUTPATIENT)
Age: 34
End: 2024-10-30
Payer: COMMERCIAL

## 2024-10-30 VITALS
OXYGEN SATURATION: 99 % | WEIGHT: 275 LBS | HEIGHT: 74 IN | BODY MASS INDEX: 35.29 KG/M2 | SYSTOLIC BLOOD PRESSURE: 118 MMHG | HEART RATE: 66 BPM | DIASTOLIC BLOOD PRESSURE: 88 MMHG | TEMPERATURE: 97.3 F

## 2024-10-30 DIAGNOSIS — K76.0 METABOLIC DYSFUNCTION-ASSOCIATED STEATOTIC LIVER DISEASE (MASLD): ICD-10-CM

## 2024-10-30 DIAGNOSIS — Z83.719 FAMILY HISTORY OF COLONIC POLYPS: ICD-10-CM

## 2024-10-30 DIAGNOSIS — R74.8 ELEVATED LIVER ENZYMES: ICD-10-CM

## 2024-10-30 DIAGNOSIS — Z12.11 SCREENING FOR COLON CANCER: Primary | ICD-10-CM

## 2024-10-30 PROCEDURE — 99213 OFFICE O/P EST LOW 20 MIN: CPT | Performed by: FAMILY MEDICINE

## 2024-10-30 NOTE — PATIENT INSTRUCTIONS
Lab orders for bloodwork given to patient.  US Elastography-will decide to schedule after results of blood work.  3M F/U scheduled with MEKHI Rivera on 2/5/2025 @ [address]

## 2024-10-30 NOTE — PROGRESS NOTES
[facility] - Outpatient Consultation  Odin Obrien 34 y.o. male MRN: [id number]  Encounter: [id number]          ASSESSMENT AND PLAN:      1. Elevated liver enzymes  2. Metabolic dysfunction-associated steatotic liver disease (MASLD)  Patient with mildly elevated serum transaminases since March 2022 with a more significant degree of elevations in June 2024. His peak serum ALT was 277. He has had an extensive serologic evaluation which has been unremarkable for competing causes of liver disease. He also had an RUQ US which demonstrated hepatic steatosis. No clinical, serologic or radiographic evidence of chronic liver disease.    Suspect that his mild elevations were secondary to MASLD. However, he started taking red rice yeast for his cholesterol in August 2023 and suspect that this is contributing to his moderate elevations. Recommended that he hold all over-the-counter medication/herbal supplements with repeat liver function tests (CBC, CMP, INR) x2 weeks. Will also check for A1AT Pi* carrier status and immunity to hepatitis A and B in the setting of fatty liver disease.    Otherwise, discussed the basic pathophysiology of fatty liver disease and recommendations for treatment including steady and sustainable weight loss, optimization of his metabolic risk factors and complete EtOH abstinence for the time being. He was ordered for US elastography but will hold off on scheduling this until his liver enzymes have improved as to not overestimate his LSM. Further recommendations pending his workup.    - Ambulatory Referral to Gastroenterology  - CBC and differential; Future  - Comprehensive metabolic panel; Future  - Protime-INR; Future  - Alpha 1 Antitrypsin Phenotype; Future  - Hepatitis A antibody, total; Future  - Hepatitis B surface antibody; Future  - US elastography/UGAP; Future    3. Screening for colon cancer  4. Family history of colonic polyps  +FH of colon polyps  including his brother at 36 y/o.   Colonoscopy (3/12/2024) was normal aside from small hemorrhoids.  Recommended he have a repeat colonoscopy at 4/4 y/o unless his brother were to have an advanced tubular adenoma (>1 cm in size, w/ high grade dysplasia or w/ villous elements).       Follow-up in 3 months.     ______________________________________________________________________    HPI: Patient is a 34 y.o. male with PMH significant for obesity and HLD and who presents today for a consultation regarding elevated liver enzymes and fatty liver disease.     Mando has had mildly elevated serum transaminases since March 2022 but was noted to have more moderate elevations in June 2024 with a peak serum ALT of 277. His bilirubin has also been mild and intermittently elevated. He has had an extensive serologic evaluation including an acute hepatitis panel, chronic hepatitis panel, MADONNA, AMA, ASMA, IgG immunoglobulins, A1AT level, iron studies, ceruloplasmin level, LK M antibody and celiac disease panel which has been largely unremarkable. He had both a complete abdominal ultrasound and RUQ US with Doppler which demonstrated hepatic steatosis. Serologies show a preserved platelet count.    Denies a personal history of chronic liver disease. Denies a family history of liver disease or liver-related cancers. Denies any known past or current infection with viral hepatitis. Denies being treated for any autoimmune conditions. He is taking a fish oil supplement and red rice yeast. Denies excessive Tylenol use.     He was previously drinking 6-10 alcoholic beverages over the course his weekends but has been alcohol free x1 month.    He has a +FH of colonic polyps and is up-to-date with CRC screening.       REVIEW OF SYSTEMS:    CONSTITUTIONAL: Denies any fever, chills, rigors, and weight loss.  HEENT: No earache or tinnitus. Denies hearing loss or visual disturbances.  CARDIOVASCULAR: No chest pain or palpitations.   RESPIRATORY:  "Denies any cough, hemoptysis, shortness of breath or dyspnea on exertion.  GASTROINTESTINAL: As noted in the History of Present Illness.   GENITOURINARY: No problems with urination. Denies any hematuria or dysuria.  NEUROLOGIC: No dizziness or vertigo, denies headaches.   MUSCULOSKELETAL: Denies any muscle or joint pain.   SKIN: Denies skin rashes or itching.   ENDOCRINE: Denies excessive thirst. Denies intolerance to heat or cold.  PSYCHOSOCIAL: Denies depression or anxiety. Denies any recent memory loss.       Historical Information   Past Medical History:   Diagnosis Date    Gout     Internal derangement of knee     Left - last assessed: Dec 22, 2015    Syncope 02/11/2023     Past Surgical History:   Procedure Laterality Date    KNEE CARTILAGE SURGERY Left     2 surgeries    TRICEPS TENDON RELEASE Left     tricep repair    WISDOM TOOTH EXTRACTION       Social History   Social History     Substance and Sexual Activity   Alcohol Use Yes    Comment: social      Social History     Substance and Sexual Activity   Drug Use Never     Social History     Tobacco Use   Smoking Status Never   Smokeless Tobacco Never     Family History   Problem Relation Age of Onset    Breast cancer Mother     Hodgkin's lymphoma Mother     Heart defect Mother     No Known Problems Father        Meds/Allergies       Current Outpatient Medications:     Colchicine 0.6 MG CAPS    ondansetron (ZOFRAN-ODT) 4 mg disintegrating tablet    No Known Allergies        Objective     Blood pressure 118/88, pulse 66, temperature (!) 97.3 °F (36.3 °C), temperature source Tympanic, height 6' 2\" (1.88 m), weight 125 kg (275 lb), SpO2 99%. Body mass index is 35.31 kg/m².        PHYSICAL EXAM:      General Appearance:   Alert, cooperative, no distress   HEENT:   Normocephalic, atraumatic, anicteric.     Neck:  Supple, symmetrical, trachea midline   Lungs:   Clear to auscultation bilaterally; no rales, rhonchi or wheezing; respirations unlabored    Heart::   " Regular rate and rhythm; no murmur, rub, or gallop.   Abdomen:   Soft, non-tender, non-distended; normal bowel sounds; no masses, no organomegaly    Genitalia:   Deferred    Rectal:   Deferred    Extremities:  No cyanosis, clubbing or edema    Pulses:  2+ and symmetric    Skin:  No jaundice, rashes, or lesions    Lymph nodes:  No palpable cervical lymphadenopathy        Lab Results:   No visits with results within 1 Day(s) from this visit.   Latest known visit with results is:   Appointment on 10/01/2024   Component Date Value    Total Bilirubin 10/01/2024 1.08 (H)     Bilirubin, Direct 10/01/2024 0.24 (H)     Alkaline Phosphatase 10/01/2024 70     AST 10/01/2024 46 (H)     ALT 10/01/2024 129 (H)     Total Protein 10/01/2024 6.7     Albumin 10/01/2024 4.4     MADONNA 10/01/2024 Negative     Liver-Kidney Microsomal * 10/01/2024 <20.1     IGA 10/01/2024 123     IGG 10/01/2024 740     IGM 10/01/2024 90     Glucose, Random 10/01/2024 81     Cholesterol, Total 10/01/2024 254 (H)     BILIRUBIN, TOTAL 10/01/2024 0.6     AST (SGOT) P5P 10/01/2024 60 (H)     Triglycerides 10/01/2024 141     ALT (SGPT) 10/01/2024 161 (H)     Haptoglobin 10/01/2024 68     GGT 10/01/2024 76 (H)     Apolipoprotein A-1 10/01/2024 131     Alpha 2-Macroglobulins, * 10/01/2024 111     Fibrosis Scoring: 10/01/2024 Comment     GARCIA Scoring 10/01/2024 Comment     Fibrosis Score 10/01/2024 0.15     Fibrosis Stage 10/01/2024 Comment     Steatosis Score 10/01/2024 0.69 (H)     Steatosis Grade 10/01/2024 Comment     Comment: 10/01/2024 Comment     GARCIA SCORE 10/01/2024 0.61 (H)     GARCIA GRADE 10/01/2024 Comment     METHODOLOGY 10/01/2024 Comment     INTERPRETATION 10/01/2024 Comment     STEATOSIS SCORING 10/01/2024 Comment     LIMITATIONS: 10/01/2024 Comment     Iron Saturation 10/01/2024 49     TIBC 10/01/2024 356     Iron 10/01/2024 175     UIBC 10/01/2024 181     Ferritin 10/01/2024 235     Deaminated Gliadin IgA I* 10/01/2024 Negative     Deaminated  Gliadin IgA 10/01/2024 <0.2     Deaminated Gliadin IgG 10/01/2024 Negative     Deaminated Gliadin IgG 10/01/2024 0.4     Tissue Transglutaminase * 10/01/2024 Negative     Tissue Transglutaminase * 10/01/2024 <0.8     Tissue Transglutaminase * 10/01/2024 Negative     Tissue Transglutaminase * 10/01/2024 <0.5     IGA 10/01/2024 123          Radiology Results:   US right upper quadrant with liver dopplers    Result Date: 10/2/2024  Narrative: RIGHT UPPER QUADRANT ULTRASOUND WITH LIVER DOPPLER INDICATION: R74.01: Elevation of levels of liver transaminase levels. COMPARISON: Abdomen ultrasound 6/5/2024 TECHNIQUE: Real-time ultrasound of the right upper quadrant was performed with a curvilinear transducer with both volumetric sweeps and still imaging techniques. FINDINGS: PANCREAS: Visualized portions of the pancreas are within normal limits. AORTA AND IVC: Visualized portions are normal for patient age. LIVER: Size: Within normal range. The liver measures 15.1 cm in the midclavicular line. Contour: Surface contour is smooth. Parenchyma: There is mild diffuse increased echogenicity with smooth echotexture, without significant beam attenuation or loss of periportal echogenicity. Most consistent with mild hepatic steatosis. No liver mass identified. LIVER DOPPLER: The main portal vein and primary branch segments are patent and hepatopetal with normal spectral waveform. Hepatic veins are patent. Spectral waveforms within normal limits. Main hepatic artery appears normal size, patent with normal spectral waveform. BILIARY: The gallbladder is normal in caliber. No wall thickening or pericholecystic fluid. No stones or sludge identified. No sonographic Alejandro's sign. No intrahepatic biliary dilatation. CBD measures 5.0 mm. No choledocholithiasis. KIDNEY: Right kidney measures 10.4 x 6.6 x 5.8 cm. Volume 208.6 mL Kidney within normal limits. ASCITES: None.     Impression: Mild hepatic steatosis. Normal hepatic Doppler  evaluation. Remainder of the examination is normal. Workstation performed: [id number]       Diane Rivera PA-C     **Please note: Dictation voice to text software may have been used in the creation of this record. Occasional wrong word or “sound alike” substitutions may have occurred due to the inherent limitations of voice recognition software. Read the chart carefully and recognize, using context, where substitutions have occurred.**

## 2025-03-14 ENCOUNTER — APPOINTMENT (OUTPATIENT)
Dept: LAB | Facility: CLINIC | Age: 35
End: 2025-03-14
Payer: COMMERCIAL

## 2025-03-14 DIAGNOSIS — R74.8 ELEVATED LIVER ENZYMES: ICD-10-CM

## 2025-03-14 DIAGNOSIS — K76.0 METABOLIC DYSFUNCTION-ASSOCIATED STEATOTIC LIVER DISEASE (MASLD): ICD-10-CM

## 2025-03-14 LAB
ALBUMIN SERPL BCG-MCNC: 4.6 G/DL (ref 3.5–5)
ALP SERPL-CCNC: 76 U/L (ref 34–104)
ALT SERPL W P-5'-P-CCNC: 194 U/L (ref 7–52)
ANION GAP SERPL CALCULATED.3IONS-SCNC: 5 MMOL/L (ref 4–13)
AST SERPL W P-5'-P-CCNC: 65 U/L (ref 13–39)
BASOPHILS # BLD AUTO: 0.03 THOUSANDS/ÂΜL (ref 0–0.1)
BASOPHILS NFR BLD AUTO: 1 % (ref 0–1)
BILIRUB SERPL-MCNC: 0.74 MG/DL (ref 0.2–1)
BUN SERPL-MCNC: 14 MG/DL (ref 5–25)
CALCIUM SERPL-MCNC: 9.5 MG/DL (ref 8.4–10.2)
CHLORIDE SERPL-SCNC: 108 MMOL/L (ref 96–108)
CO2 SERPL-SCNC: 26 MMOL/L (ref 21–32)
CREAT SERPL-MCNC: 0.89 MG/DL (ref 0.6–1.3)
EOSINOPHIL # BLD AUTO: 0.15 THOUSAND/ÂΜL (ref 0–0.61)
EOSINOPHIL NFR BLD AUTO: 3 % (ref 0–6)
ERYTHROCYTE [DISTWIDTH] IN BLOOD BY AUTOMATED COUNT: 12.8 % (ref 11.6–15.1)
GFR SERPL CREATININE-BSD FRML MDRD: 111 ML/MIN/1.73SQ M
GLUCOSE P FAST SERPL-MCNC: 87 MG/DL (ref 65–99)
HAV AB SER QL IA: NORMAL
HBV SURFACE AB SER-ACNC: 5.14 MIU/ML
HCT VFR BLD AUTO: 47.5 % (ref 36.5–49.3)
HGB BLD-MCNC: 16.8 G/DL (ref 12–17)
IMM GRANULOCYTES # BLD AUTO: 0.01 THOUSAND/UL (ref 0–0.2)
IMM GRANULOCYTES NFR BLD AUTO: 0 % (ref 0–2)
INR PPP: 0.92 (ref 0.85–1.19)
LYMPHOCYTES # BLD AUTO: 2.55 THOUSANDS/ÂΜL (ref 0.6–4.47)
LYMPHOCYTES NFR BLD AUTO: 44 % (ref 14–44)
MCH RBC QN AUTO: 30.7 PG (ref 26.8–34.3)
MCHC RBC AUTO-ENTMCNC: 35.4 G/DL (ref 31.4–37.4)
MCV RBC AUTO: 87 FL (ref 82–98)
MONOCYTES # BLD AUTO: 0.54 THOUSAND/ÂΜL (ref 0.17–1.22)
MONOCYTES NFR BLD AUTO: 9 % (ref 4–12)
NEUTROPHILS # BLD AUTO: 2.45 THOUSANDS/ÂΜL (ref 1.85–7.62)
NEUTS SEG NFR BLD AUTO: 43 % (ref 43–75)
NRBC BLD AUTO-RTO: 0 /100 WBCS
PLATELET # BLD AUTO: 197 THOUSANDS/UL (ref 149–390)
PMV BLD AUTO: 9.9 FL (ref 8.9–12.7)
POTASSIUM SERPL-SCNC: 4.1 MMOL/L (ref 3.5–5.3)
PROT SERPL-MCNC: 6.9 G/DL (ref 6.4–8.4)
PROTHROMBIN TIME: 13 SECONDS (ref 12.3–15)
RBC # BLD AUTO: 5.47 MILLION/UL (ref 3.88–5.62)
SODIUM SERPL-SCNC: 139 MMOL/L (ref 135–147)
WBC # BLD AUTO: 5.73 THOUSAND/UL (ref 4.31–10.16)

## 2025-03-14 PROCEDURE — 80053 COMPREHEN METABOLIC PANEL: CPT

## 2025-03-14 PROCEDURE — 86708 HEPATITIS A ANTIBODY: CPT

## 2025-03-14 PROCEDURE — 85610 PROTHROMBIN TIME: CPT

## 2025-03-14 PROCEDURE — 36415 COLL VENOUS BLD VENIPUNCTURE: CPT

## 2025-03-14 PROCEDURE — 85025 COMPLETE CBC W/AUTO DIFF WBC: CPT

## 2025-03-14 PROCEDURE — 82103 ALPHA-1-ANTITRYPSIN TOTAL: CPT

## 2025-03-14 PROCEDURE — 82104 ALPHA-1-ANTITRYPSIN PHENO: CPT

## 2025-03-14 PROCEDURE — 86706 HEP B SURFACE ANTIBODY: CPT

## 2025-03-19 ENCOUNTER — OFFICE VISIT (OUTPATIENT)
Dept: FAMILY MEDICINE CLINIC | Facility: CLINIC | Age: 35
End: 2025-03-19
Payer: COMMERCIAL

## 2025-03-19 VITALS
SYSTOLIC BLOOD PRESSURE: 130 MMHG | OXYGEN SATURATION: 98 % | HEIGHT: 75 IN | RESPIRATION RATE: 16 BRPM | BODY MASS INDEX: 34.32 KG/M2 | DIASTOLIC BLOOD PRESSURE: 80 MMHG | HEART RATE: 82 BPM | TEMPERATURE: 97.8 F | WEIGHT: 276 LBS

## 2025-03-19 DIAGNOSIS — R74.8 ELEVATED LIVER ENZYMES: ICD-10-CM

## 2025-03-19 DIAGNOSIS — Z00.00 ANNUAL PHYSICAL EXAM: Primary | ICD-10-CM

## 2025-03-19 DIAGNOSIS — E78.2 MIXED HYPERLIPIDEMIA: ICD-10-CM

## 2025-03-19 PROBLEM — R07.1 CHEST PAIN ON BREATHING: Status: RESOLVED | Noted: 2024-06-05 | Resolved: 2025-03-19

## 2025-03-19 PROCEDURE — 99213 OFFICE O/P EST LOW 20 MIN: CPT | Performed by: FAMILY MEDICINE

## 2025-03-19 PROCEDURE — 99395 PREV VISIT EST AGE 18-39: CPT | Performed by: FAMILY MEDICINE

## 2025-03-19 NOTE — ASSESSMENT & PLAN NOTE
Due to moderate-severe hepatic steatosis  Reminded him about getting ultrasound as ordered by GI  To f/u with hepatologist  Weight loss, diet, exercise encouraged today   He will look into GLP1 injections to help with weight loss   Recommended food diary with shelby pal

## 2025-03-19 NOTE — PROGRESS NOTES
Adult Annual Physical  Name: Odin Obrien      : 1990      MRN: [id number]  Encounter Provider: Melodie Squires MD  Encounter Date: 3/19/2025   Encounter department: [facility]    Assessment & Plan  Annual physical exam    Orders:  •  Lipid panel  •  Comprehensive metabolic panel  •  CBC and differential  •  Hemoglobin A1C    Elevated liver enzymes  Due to moderate-severe hepatic steatosis  Reminded him about getting ultrasound as ordered by GI  To f/u with hepatologist  Weight loss, diet, exercise encouraged today   He will look into GLP1 injections to help with weight loss   Recommended food diary with shelby pal         Mixed hyperlipidemia  Diet, exercise and portion control   Ordered labs          Preventive Screenings:  - Diabetes Screening: screening up-to-date  - Cholesterol Screening: screening not indicated and has hyperlipidemia   - Hepatitis C screening: screening up-to-date   - HIV screening: screening up-to-date   - Colon cancer screening: screening not indicated   - Lung cancer screening: screening not indicated   - Prostate cancer screening: screening not indicated     Immunizations:  - Immunizations due: Influenza  - The patient declines recommended vaccines currently despite my recommendations      Counseling/Anticipatory Guidance:  - Alcohol: discussed moderation in alcohol intake and recommendations for healthy alcohol use.   - Drug use: discussed harms of illicit drug use and how it can negatively impact mental/physical health.   - Dental health: discussed importance of regular tooth brushing, flossing, and dental visits.   - Sexual health: discussed sexually transmitted diseases, partner selection, use of condoms, avoidance of unintended pregnancy, and contraceptive alternatives.   - Diet: discussed recommendations for a healthy/well-balanced diet.   - Exercise: the importance of regular exercise/physical activity was discussed. Recommend exercise 3-5 times per  week for at least 30 minutes.     BMI Counseling: Body mass index is 34.5 kg/m². The BMI is above normal. Exercise recommendations include exercising 3-5 times per week.     Depression Screening and Follow-up Plan: Patient was screened for depression during today's encounter. They screened negative with a PHQ-2 score of 0.          History of Present Illness     Adult Annual Physical:  Patient presents for annual physical.     Diet and Physical Activity:  - Diet/Nutrition: no special diet.  - Exercise: walking, strength training exercises and 1-2 times a week on average.    Depression Screening:  - PHQ-2 Score: 0    General Health:  - Sleep: sleeps well.  - Hearing: normal hearing bilateral ears.  - Vision: no vision problems and previous LASIK surgery.  - Dental: regular dental visits, brushes teeth twice daily and floss regularly.    /GYN Health:  - Follows with GYN: no.   - History of STDs: no     Health:  - History of STDs: no.   - Urinary symptoms: none.     Advanced Care Planning:  - Has an advanced directive?: no    - Has a durable medical POA?: no    - ACP document given to patient?: no      Review of Systems   Constitutional: Negative.  Negative for chills and fever.   HENT:  Negative for ear pain and sore throat.    Eyes: Negative.  Negative for pain and visual disturbance.   Respiratory:  Negative for cough and shortness of breath.    Cardiovascular:  Negative for chest pain and palpitations.   Gastrointestinal:  Negative for abdominal pain and vomiting.   Endocrine: Negative.    Genitourinary:  Negative for dysuria and hematuria.   Musculoskeletal:  Negative for arthralgias and back pain.   Skin:  Negative for color change and rash.   Allergic/Immunologic: Negative.    Neurological:  Negative for seizures and syncope.   Hematological: Negative.    Psychiatric/Behavioral: Negative.     All other systems reviewed and are negative.    Medical History Reviewed by provider this encounter:  Allergies   "Meds  Problems     .  Past Medical History   Past Medical History:   Diagnosis Date   • Chest pain on breathing 06/05/2024   • Gout    • Internal derangement of knee     Left - last assessed: Dec 22, 2015   • Syncope 02/11/2023     Past Surgical History:   Procedure Laterality Date   • KNEE CARTILAGE SURGERY Left     2 surgeries   • TRICEPS TENDON RELEASE Left     tricep repair   • WISDOM TOOTH EXTRACTION       Family History   Problem Relation Age of Onset   • Breast cancer Mother    • Hodgkin's lymphoma Mother    • Heart defect Mother    • No Known Problems Father       reports that he has never smoked. He has never been exposed to tobacco smoke. He has never used smokeless tobacco. He reports current alcohol use. He reports that he does not use drugs.  Current Outpatient Medications   Medication Instructions   • ondansetron (ZOFRAN-ODT) 4 mg, Oral, Every 6 hours PRN   No Known Allergies   Current Outpatient Medications on File Prior to Visit   Medication Sig Dispense Refill   • ondansetron (ZOFRAN-ODT) 4 mg disintegrating tablet Take 1 tablet (4 mg total) by mouth every 6 (six) hours as needed for nausea or vomiting 20 tablet 0   • [DISCONTINUED] Colchicine 0.6 MG CAPS TAKE 2 CAPSULES (1.2 MG) X 1 THEN , TAKE 1 CAPSULE (0.6 MG) 1 HOUR LATER if needed for pain 3 capsule 0     No current facility-administered medications on file prior to visit.      Social History     Tobacco Use   • Smoking status: Never     Passive exposure: Never   • Smokeless tobacco: Never   Vaping Use   • Vaping status: Never Used   Substance and Sexual Activity   • Alcohol use: Yes     Comment: social    • Drug use: Never   • Sexual activity: Yes     Partners: Female       Objective   /80 (BP Location: Left arm, Patient Position: Sitting, Cuff Size: Standard)   Pulse 82   Temp 97.8 °F (36.6 °C) (Tympanic)   Resp 16   Ht 6' 3\" (1.905 m)   Wt 125 kg (276 lb)   SpO2 98%   BMI 34.50 kg/m²     Physical Exam  Vitals and nursing note " reviewed.   Constitutional:       Appearance: He is well-developed.   HENT:      Head: Normocephalic and atraumatic.      Right Ear: Tympanic membrane normal.      Left Ear: Tympanic membrane normal.      Nose: Nose normal.      Mouth/Throat:      Mouth: Mucous membranes are moist.   Eyes:      Pupils: Pupils are equal, round, and reactive to light.   Neck:      Thyroid: No thyromegaly.   Cardiovascular:      Rate and Rhythm: Normal rate and regular rhythm.      Heart sounds: No murmur heard.  Pulmonary:      Effort: Pulmonary effort is normal.      Breath sounds: Normal breath sounds.   Abdominal:      General: Bowel sounds are normal.      Palpations: Abdomen is soft.   Musculoskeletal:         General: No deformity. Normal range of motion.      Cervical back: Normal range of motion and neck supple.   Skin:     General: Skin is warm.      Capillary Refill: Capillary refill takes less than 2 seconds.      Findings: No erythema or rash.   Neurological:      General: No focal deficit present.      Mental Status: He is alert and oriented to person, place, and time.      Deep Tendon Reflexes: Reflexes are normal and symmetric.   Psychiatric:         Mood and Affect: Mood normal.         Behavior: Behavior normal.     BMI Counseling: Body mass index is 34.5 kg/m². The BMI is above normal. Nutrition recommendations include decreasing overall calorie intake, 3-5 servings of fruits/vegetables daily, reducing fast food intake, consuming healthier snacks, and moderation in carbohydrate intake. Exercise recommendations include moderate aerobic physical activity for 150 minutes/week.

## 2025-03-20 LAB
A1AT PHENOTYP SERPL IFE: NORMAL
A1AT SERPL-MCNC: 149 MG/DL (ref 95–164)

## 2025-04-15 ENCOUNTER — RESULTS FOLLOW-UP (OUTPATIENT)
Dept: GASTROENTEROLOGY | Facility: CLINIC | Age: 35
End: 2025-04-15

## 2025-04-15 DIAGNOSIS — R74.8 ELEVATED LIVER ENZYMES: Primary | ICD-10-CM

## 2025-04-16 NOTE — TELEPHONE ENCOUNTER
----- Message from Diane Rivera PA-C sent at 4/15/2025 11:32 PM EDT -----  Please call patient with results.    Patient liver enzymes have up-trended since October 2024. Please ensure that he has been holding all over-the-counter medications/herbal supplements including red rice yeast. If so, I would like for him to have repeat labs now to reassess his liver chemistries. If they remain elevated to this degree then we may need to discuss the possibility of a liver biopsy.

## 2025-05-09 ENCOUNTER — APPOINTMENT (OUTPATIENT)
Dept: LAB | Facility: CLINIC | Age: 35
End: 2025-05-09
Attending: FAMILY MEDICINE
Payer: COMMERCIAL

## 2025-05-09 DIAGNOSIS — R74.8 ELEVATED LIVER ENZYMES: ICD-10-CM

## 2025-05-09 LAB
ALBUMIN SERPL BCG-MCNC: 4.4 G/DL (ref 3.5–5)
ALP SERPL-CCNC: 68 U/L (ref 34–104)
ALT SERPL W P-5'-P-CCNC: 149 U/L (ref 7–52)
AST SERPL W P-5'-P-CCNC: 51 U/L (ref 13–39)
BILIRUB DIRECT SERPL-MCNC: 0.16 MG/DL (ref 0–0.2)
BILIRUB SERPL-MCNC: 0.96 MG/DL (ref 0.2–1)
PROT SERPL-MCNC: 6.7 G/DL (ref 6.4–8.4)

## 2025-05-09 PROCEDURE — 80076 HEPATIC FUNCTION PANEL: CPT

## 2025-05-09 PROCEDURE — 36415 COLL VENOUS BLD VENIPUNCTURE: CPT

## 2025-05-21 ENCOUNTER — OFFICE VISIT (OUTPATIENT)
Age: 35
End: 2025-05-21

## 2025-05-21 ENCOUNTER — PREP FOR PROCEDURE (OUTPATIENT)
Dept: INTERVENTIONAL RADIOLOGY/VASCULAR | Facility: CLINIC | Age: 35
End: 2025-05-21

## 2025-05-21 VITALS
BODY MASS INDEX: 33.67 KG/M2 | DIASTOLIC BLOOD PRESSURE: 78 MMHG | WEIGHT: 270.8 LBS | HEART RATE: 64 BPM | HEIGHT: 75 IN | SYSTOLIC BLOOD PRESSURE: 120 MMHG

## 2025-05-21 DIAGNOSIS — Z12.11 SCREENING FOR COLON CANCER: ICD-10-CM

## 2025-05-21 DIAGNOSIS — Z83.719 FAMILY HISTORY OF COLONIC POLYPS: ICD-10-CM

## 2025-05-21 DIAGNOSIS — R74.8 ELEVATED LIVER ENZYMES: Primary | ICD-10-CM

## 2025-05-21 DIAGNOSIS — R79.89 ELEVATED LFTS: Primary | ICD-10-CM

## 2025-05-21 DIAGNOSIS — K76.0 METABOLIC DYSFUNCTION-ASSOCIATED STEATOTIC LIVER DISEASE (MASLD): ICD-10-CM

## 2025-05-21 NOTE — ASSESSMENT & PLAN NOTE
Patient with mildly elevated serum transaminases since March 2022 with a more significant degree of elevations in June 2024. His peak serum ALT was 277. He has had an extensive serologic evaluation which has been unremarkable for competing causes of liver disease. He also had an RUQ US which demonstrated hepatic steatosis. No clinical, serologic or radiographic evidence of chronic liver disease.     Again, suspect that his mild elevations were secondary to MASLD. He also started taking red rice yeast for his cholesterol in August 2023 which may have contributed to his more-moderate elevations. He has been holding red rice yeast since October and has lost 5 lbs since his last appointment. He had a up-trending serum transaminases in March with slight improvement on recent repeat labs. He did not complete his US elastography.     Discussed pursuing a liver biopsy for further histologic evaluation given the degree of his elevations vs trending his liver chemistries with aggressive efforts towards weight loss.  He is not interested in medications for weight loss but is interested in pursuing a liver biopsy.  Discussed procedure and possible associated risks. He would like to proceed with a liver biopsy, referred to IR. Cancelled order for his US elastography.     He is otherwise aware of the visit pathophysiology of fatty liver disease, potential to progress to cirrhosis of left untreated and recommendations for treatment including setting sustainable weight loss, optimization of metabolic risk factors and limiting his EtOH use. Recommend hep A and B vaccination given non-immunity. Further recommendations pending the results of his liver biopsy.    Orders:  •  Ambulatory Referral to Interventional Radiology; Future

## 2025-05-21 NOTE — PROGRESS NOTES
Name: Odin Obrien      : 1990      MRN: [id number]  Encounter Provider: Diane Rivera PA-C  Encounter Date: 2025   Encounter department: [facility] GA[facility]OENTEROLOG[facility] [address]  :  Assessment & Plan  Elevated liver enzymes  Patient with mildly elevated serum transaminases since 2022 with a more significant degree of elevations in 2024. His peak serum ALT was 277. He has had an extensive serologic evaluation which has been unremarkable for competing causes of liver disease. He also had an RUQ US which demonstrated hepatic steatosis. No clinical, serologic or radiographic evidence of chronic liver disease.     Again, suspect that his mild elevations were secondary to MASLD. He also started taking red rice yeast for his cholesterol in 2023 which may have contributed to his more-moderate elevations. He has been holding red rice yeast since October and has lost 5 lbs since his last appointment. He had a up-trending serum transaminases in March with slight improvement on recent repeat labs. He did not complete his US elastography.     Discussed pursuing a liver biopsy for further histologic evaluation given the degree of his elevations vs trending his liver chemistries with aggressive efforts towards weight loss.  He is not interested in medications for weight loss but is interested in pursuing a liver biopsy.  Discussed procedure and possible associated risks. He would like to proceed with a liver biopsy, referred to IR. Cancelled order for his US elastography.     He is otherwise aware of the visit pathophysiology of fatty liver disease, potential to progress to cirrhosis of left untreated and recommendations for treatment including setting sustainable weight loss, optimization of metabolic risk factors and limiting his EtOH use. Recommend hep A and B vaccination given non-immunity. Further recommendations pending the results of his liver biopsy.    Orders:  •   Ambulatory Referral to Interventional Radiology; Future    Metabolic dysfunction-associated steatotic liver disease (MASLD)  Refer to A/P above.     Orders:  •  Ambulatory Referral to Interventional Radiology; Future    Screening for colon cancer  +FH of colon polyps including his brother at 36 y/o.   Colonoscopy (3/12/2024) was normal aside from small hemorrhoids.  Recommended he have a repeat colonoscopy at 46 y/o unless his brother were to have an advanced tubular adenoma (>1 cm in size, w/ high grade dysplasia or w/ villous elements).        Family history of colonic polyps  Refer to A/P above.        Follow-up in 6 months or sooner if necessary.       History of Present Illness   HPI    Odin Obrien is a 34 y.o. male with PMH significant for obesity and HLD and who presents today for a follow-up regarding elevated liver enzymes and fatty liver disease.     Interval history  - Last seen 10/30/2024  - Labs (3/14) with moderately elevated serum transaminases (AST 65, ) but otherwise normal LFTs. Also with a normal A1AT phenotype and lacking immunity to hepatitis A and B.  - Repeat hepatic function panel (5/9) with slightly improved serum transaminases (AST 51, )  - He did not complete his US elastography as previously ordered.  - He has lost 5 lbs since his last appointment.    Extended liver history  Mando has had mildly elevated serum transaminases since March 2022 but was noted to have more moderate elevations in June 2024 with a peak serum ALT of 277. His bilirubin has also been mild and intermittently elevated. He has had an extensive serologic evaluation including an acute hepatitis panel, chronic hepatitis panel, MADONNA, AMA, ASMA, IgG immunoglobulins, A1AT level, iron studies, ceruloplasmin level, LKM antibody and celiac disease panel which has been largely unremarkable. He had both a complete abdominal ultrasound and RUQ US with Doppler which demonstrated hepatic steatosis. Serologies show a  preserved platelet count.     Denies a personal history of chronic liver disease. Denies a family history of liver disease or liver-related cancers. Denies any known past or current infection with viral hepatitis. Denies being treated for any autoimmune conditions. He is taking a fish oil supplement and red rice yeast. Denies excessive Tylenol use.      He was previously drinking 6-10 alcoholic beverages over the course his weekends but has been alcohol free x1 month.     He has a +FH of colonic polyps and is up-to-date with CRC screening.       History obtained from: patient    Review of Systems  Pertinent Medical History     Medical History Reviewed by provider this encounter:  Tobacco  Allergies  Meds  Problems  Med Hx  Surg Hx  Fam Hx     .  Past Medical History   Past Medical History:   Diagnosis Date   • Chest pain on breathing 06/05/2024   • Gout    • Internal derangement of knee     Left - last assessed: Dec 22, 2015   • Syncope 02/11/2023     Past Surgical History:   Procedure Laterality Date   • KNEE CARTILAGE SURGERY Left     2 surgeries   • TRICEPS TENDON RELEASE Left     tricep repair   • WISDOM TOOTH EXTRACTION       Family History   Problem Relation Name Age of Onset   • Breast cancer Mother Yancy Obrien    • Hodgkin's lymphoma Mother Yancy Obrien    • Heart defect Mother Yancy Obrien    • No Known Problems Father        reports that he has never smoked. He has never been exposed to tobacco smoke. He has never used smokeless tobacco. He reports current alcohol use. He reports that he does not use drugs.  Current Outpatient Medications   Medication Instructions   • ondansetron (ZOFRAN-ODT) 4 mg, Oral, Every 6 hours PRN   Allergies[1]   Medications Ordered Prior to Encounter[2]   Social History     Tobacco Use   • Smoking status: Never     Passive exposure: Never   • Smokeless tobacco: Never   Vaping Use   • Vaping status: Never Used   Substance and Sexual Activity   • Alcohol use: Yes      "Comment: social    • Drug use: Never   • Sexual activity: Yes     Partners: Female        Objective   /78   Pulse 64   Ht 6' 3\" (1.905 m)   Wt 123 kg (270 lb 12.8 oz)   BMI 33.85 kg/m²      Physical Exam             [1]  No Known Allergies[2]  Current Outpatient Medications on File Prior to Visit   Medication Sig Dispense Refill   • ondansetron (ZOFRAN-ODT) 4 mg disintegrating tablet Take 1 tablet (4 mg total) by mouth every 6 (six) hours as needed for nausea or vomiting 20 tablet 0     No current facility-administered medications on file prior to visit.   "

## 2025-05-21 NOTE — H&P (VIEW-ONLY)
Name: Odin Obrien      : 1990      MRN: [id number]  Encounter Provider: Diane Rivera PA-C  Encounter Date: 2025   Encounter department: [facility] GASTROENTEROLOGY SPECIAL[facility] [address]  :  Assessment & Plan  Elevated liver enzymes  Patient with mildly elevated serum transaminases since 2022 with a more significant degree of elevations in 2024. His peak serum ALT was 277. He has had an extensive serologic evaluation which has been unremarkable for competing causes of liver disease. He also had an RUQ US which demonstrated hepatic steatosis. No clinical, serologic or radiographic evidence of chronic liver disease.     Again, suspect that his mild elevations were secondary to MASLD. He also started taking red rice yeast for his cholesterol in 2023 which may have contributed to his more-moderate elevations. He has been holding red rice yeast since October and has lost 5 lbs since his last appointment. He had a up-trending serum transaminases in March with slight improvement on recent repeat labs. He did not complete his US elastography.     Discussed pursuing a liver biopsy for further histologic evaluation given the degree of his elevations vs trending his liver chemistries with aggressive efforts towards weight loss.  He is not interested in medications for weight loss but is interested in pursuing a liver biopsy.  Discussed procedure and possible associated risks. He would like to proceed with a liver biopsy, referred to IR. Cancelled order for his US elastography.     He is otherwise aware of the visit pathophysiology of fatty liver disease, potential to progress to cirrhosis of left untreated and recommendations for treatment including setting sustainable weight loss, optimization of metabolic risk factors and limiting his EtOH use. Recommend hep A and B vaccination given non-immunity. Further recommendations pending the results of his liver biopsy.    Orders:  •   Ambulatory Referral to Interventional Radiology; Future    Metabolic dysfunction-associated steatotic liver disease (MASLD)  Refer to A/P above.     Orders:  •  Ambulatory Referral to Interventional Radiology; Future    Screening for colon cancer  +FH of colon polyps including his brother at 34 y/o.   Colonoscopy (3/12/2024) was normal aside from small hemorrhoids.  Recommended he have a repeat colonoscopy at 46 y/o unless his brother were to have an advanced tubular adenoma (>1 cm in size, w/ high grade dysplasia or w/ villous elements).        Family history of colonic polyps  Refer to A/P above.        Follow-up in 6 months or sooner if necessary.       History of Present Illness   HPI    Odin Obrien is a 34 y.o. male with PMH significant for obesity and HLD and who presents today for a follow-up regarding elevated liver enzymes and fatty liver disease.     Interval history  - Last seen 10/30/2024  - Labs (3/14) with moderately elevated serum transaminases (AST 65, ) but otherwise normal LFTs. Also with a normal A1AT phenotype and lacking immunity to hepatitis A and B.  - Repeat hepatic function panel (5/9) with slightly improved serum transaminases (AST 51, )  - He did not complete his US elastography as previously ordered.  - He has lost 5 lbs since his last appointment.    Extended liver history  Mando has had mildly elevated serum transaminases since March 2022 but was noted to have more moderate elevations in June 2024 with a peak serum ALT of 277. His bilirubin has also been mild and intermittently elevated. He has had an extensive serologic evaluation including an acute hepatitis panel, chronic hepatitis panel, MADONNA, AMA, ASMA, IgG immunoglobulins, A1AT level, iron studies, ceruloplasmin level, LKM antibody and celiac disease panel which has been largely unremarkable. He had both a complete abdominal ultrasound and RUQ US with Doppler which demonstrated hepatic steatosis. Serologies show a  preserved platelet count.     Denies a personal history of chronic liver disease. Denies a family history of liver disease or liver-related cancers. Denies any known past or current infection with viral hepatitis. Denies being treated for any autoimmune conditions. He is taking a fish oil supplement and red rice yeast. Denies excessive Tylenol use.      He was previously drinking 6-10 alcoholic beverages over the course his weekends but has been alcohol free x1 month.     He has a +FH of colonic polyps and is up-to-date with CRC screening.       History obtained from: patient    Review of Systems  Pertinent Medical History     Medical History Reviewed by provider this encounter:  Tobacco  Allergies  Meds  Problems  Med Hx  Surg Hx  Fam Hx     .  Past Medical History   Past Medical History:   Diagnosis Date   • Chest pain on breathing 06/05/2024   • Gout    • Internal derangement of knee     Left - last assessed: Dec 22, 2015   • Syncope 02/11/2023     Past Surgical History:   Procedure Laterality Date   • KNEE CARTILAGE SURGERY Left     2 surgeries   • TRICEPS TENDON RELEASE Left     tricep repair   • WISDOM TOOTH EXTRACTION       Family History   Problem Relation Name Age of Onset   • Breast cancer Mother Yancy Obrien    • Hodgkin's lymphoma Mother Yancy Obrien    • Heart defect Mother Yancy Obrien    • No Known Problems Father        reports that he has never smoked. He has never been exposed to tobacco smoke. He has never used smokeless tobacco. He reports current alcohol use. He reports that he does not use drugs.  Current Outpatient Medications   Medication Instructions   • ondansetron (ZOFRAN-ODT) 4 mg, Oral, Every 6 hours PRN   Allergies[1]   Medications Ordered Prior to Encounter[2]   Social History     Tobacco Use   • Smoking status: Never     Passive exposure: Never   • Smokeless tobacco: Never   Vaping Use   • Vaping status: Never Used   Substance and Sexual Activity   • Alcohol use: Yes      "Comment: social    • Drug use: Never   • Sexual activity: Yes     Partners: Female        Objective   /78   Pulse 64   Ht 6' 3\" (1.905 m)   Wt 123 kg (270 lb 12.8 oz)   BMI 33.85 kg/m²      Physical Exam             [1]  No Known Allergies[2]  Current Outpatient Medications on File Prior to Visit   Medication Sig Dispense Refill   • ondansetron (ZOFRAN-ODT) 4 mg disintegrating tablet Take 1 tablet (4 mg total) by mouth every 6 (six) hours as needed for nausea or vomiting 20 tablet 0     No current facility-administered medications on file prior to visit.   "

## 2025-06-02 RX ORDER — SODIUM CHLORIDE 9 MG/ML
75 INJECTION, SOLUTION INTRAVENOUS CONTINUOUS
Status: CANCELLED | OUTPATIENT
Start: 2025-06-02

## 2025-06-03 ENCOUNTER — TELEPHONE (OUTPATIENT)
Dept: RADIOLOGY | Facility: HOSPITAL | Age: 35
End: 2025-06-03

## 2025-06-03 NOTE — PRE-PROCEDURE INSTRUCTIONS
Pre-procedure Instructions for Interventional Radiology  [address] [address]  INTERVENTIONAL RADIOLOGY [phone]    You are scheduled for a/an liver biopsy.    On Monday 6-9-25.    Your tentative arrival time is 10:15am.  Short stay will notify you the day before your procedure with the exact arrival time and the location to arrive.    To prepare for your procedure:  Please arrange for someone to drive you home after the procedure and stay with you until the next morning if you are instructed to do so.  This is typically for patients receiving some type of sedative or anesthetic for the procedure.  DO NOT EAT OR DRINK ANYTHING after midnight on the evening before your procedure including candy & gum.  ONLY SIPS OF WATER with your medications are allowed on the morning of your procedure.  TAKE ALL OF YOUR REGULAR MEDICATIONS THE MORNING OF YOUR PROCEDURE with sips of water!  We may call you to stop some of your blood sugar, blood pressure and blood thinning medications depending on the procedure.  Please take all of these medications unless we instruct you to stop them.  If you have an allergy to x-ray dye, please contact Interventional Radiology for an x-ray dye preparation which usually consists of an oral steroid and Benadryl.  If you wear a Glucose Monitor, you may be asked to remove it for your procedure if we are using x-ray.  These devices need to be removed when we are imaging with x-ray near the device since the radiation can cause the unit to malfunction.  If possible and not too inconvenient, you may want to schedule your exam closer to day 14 of your 14 day device so your device is not wasted.    The day of your procedure:  Bring a list of the medications you take at home.  Bring medications you take for breathing problems (such as inhalers), medications for chest pain, or both.  Bring a case for your glasses or contacts.  Bring your insurance card and a  form of photo ID.  Please leave all valuables such as credit cards and jewelry at home.  Report to the admitting office to the left of the registration desk in the main lobby at the [facility], Entrance B.  You will then be directed to the Short Stay Center.  While your procedure is being performed, your family may wait in the Radiology Waiting Room on the 1st floor in Radiology.  if they need to leave, they may provide a number to be called following the procedure.   Be prepared to stay overnight just in case. Sometimes procedures will indicate the need for further observation or treatment.   If you are scheduled for a follow-up visit with the Interventional Radiologist after your procedure, you will be called with a date and time.    Special Instructions (Medications to stop taking before your procedure etc.):  No aspirin products for 5 days before the procedure.

## 2025-06-09 ENCOUNTER — HOSPITAL ENCOUNTER (OUTPATIENT)
Dept: RADIOLOGY | Facility: HOSPITAL | Age: 35
Discharge: HOME | End: 2025-06-09
Attending: RADIOLOGY
Payer: COMMERCIAL

## 2025-06-09 VITALS
HEART RATE: 66 BPM | SYSTOLIC BLOOD PRESSURE: 104 MMHG | RESPIRATION RATE: 17 BRPM | DIASTOLIC BLOOD PRESSURE: 55 MMHG | HEIGHT: 75 IN | WEIGHT: 270 LBS | OXYGEN SATURATION: 98 % | BODY MASS INDEX: 33.57 KG/M2 | TEMPERATURE: 97.9 F

## 2025-06-09 DIAGNOSIS — R79.89 ELEVATED LFTS: ICD-10-CM

## 2025-06-09 LAB
ANION GAP SERPL CALCULATED.3IONS-SCNC: 9 MMOL/L (ref 4–13)
BUN SERPL-MCNC: 12 MG/DL (ref 5–25)
CALCIUM SERPL-MCNC: 9.3 MG/DL (ref 8.4–10.2)
CHLORIDE SERPL-SCNC: 107 MMOL/L (ref 96–108)
CO2 SERPL-SCNC: 25 MMOL/L (ref 21–32)
CREAT SERPL-MCNC: 0.82 MG/DL (ref 0.6–1.3)
ERYTHROCYTE [DISTWIDTH] IN BLOOD BY AUTOMATED COUNT: 12.8 % (ref 11.6–15.1)
GFR SERPL CREATININE-BSD FRML MDRD: 115 ML/MIN/1.73SQ M
GLUCOSE P FAST SERPL-MCNC: 81 MG/DL (ref 65–99)
GLUCOSE SERPL-MCNC: 81 MG/DL (ref 65–140)
HCT VFR BLD AUTO: 47.4 % (ref 36.5–49.3)
HGB BLD-MCNC: 16.1 G/DL (ref 12–17)
INR PPP: 0.96 (ref 0.85–1.19)
MCH RBC QN AUTO: 29.8 PG (ref 26.8–34.3)
MCHC RBC AUTO-ENTMCNC: 34 G/DL (ref 31.4–37.4)
MCV RBC AUTO: 88 FL (ref 82–98)
PLATELET # BLD AUTO: 191 THOUSANDS/UL (ref 149–390)
PMV BLD AUTO: 9.8 FL (ref 8.9–12.7)
POTASSIUM SERPL-SCNC: 4 MMOL/L (ref 3.5–5.3)
PROTHROMBIN TIME: 13.1 SECONDS (ref 12.3–15)
RBC # BLD AUTO: 5.4 MILLION/UL (ref 3.88–5.62)
SODIUM SERPL-SCNC: 141 MMOL/L (ref 135–147)
WBC # BLD AUTO: 6.28 THOUSAND/UL (ref 4.31–10.16)

## 2025-06-09 PROCEDURE — 76942 ECHO GUIDE FOR BIOPSY: CPT

## 2025-06-09 PROCEDURE — 88313 SPECIAL STAINS GROUP 2: CPT | Performed by: PATHOLOGY

## 2025-06-09 PROCEDURE — 85610 PROTHROMBIN TIME: CPT | Performed by: RADIOLOGY

## 2025-06-09 PROCEDURE — 88307 TISSUE EXAM BY PATHOLOGIST: CPT | Performed by: PATHOLOGY

## 2025-06-09 PROCEDURE — 85027 COMPLETE CBC AUTOMATED: CPT | Performed by: RADIOLOGY

## 2025-06-09 PROCEDURE — 99152 MOD SED SAME PHYS/QHP 5/>YRS: CPT | Performed by: INTERNAL MEDICINE

## 2025-06-09 PROCEDURE — 76942 ECHO GUIDE FOR BIOPSY: CPT | Performed by: INTERNAL MEDICINE

## 2025-06-09 PROCEDURE — 47000 NEEDLE BIOPSY OF LIVER PERQ: CPT | Performed by: INTERNAL MEDICINE

## 2025-06-09 PROCEDURE — 80048 BASIC METABOLIC PNL TOTAL CA: CPT | Performed by: RADIOLOGY

## 2025-06-09 PROCEDURE — 47000 NEEDLE BIOPSY OF LIVER PERQ: CPT

## 2025-06-09 RX ORDER — SODIUM CHLORIDE 9 MG/ML
75 INJECTION, SOLUTION INTRAVENOUS CONTINUOUS
Status: DISCONTINUED | OUTPATIENT
Start: 2025-06-09 | End: 2025-06-10 | Stop reason: HOSPADM

## 2025-06-09 RX ORDER — FENTANYL CITRATE 50 UG/ML
INJECTION, SOLUTION INTRAMUSCULAR; INTRAVENOUS AS NEEDED
Status: COMPLETED | OUTPATIENT
Start: 2025-06-09 | End: 2025-06-09

## 2025-06-09 RX ORDER — MIDAZOLAM HYDROCHLORIDE 2 MG/2ML
INJECTION, SOLUTION INTRAMUSCULAR; INTRAVENOUS AS NEEDED
Status: COMPLETED | OUTPATIENT
Start: 2025-06-09 | End: 2025-06-09

## 2025-06-09 RX ORDER — OXYCODONE HYDROCHLORIDE 5 MG/1
5 TABLET ORAL EVERY 4 HOURS PRN
Refills: 0 | Status: DISCONTINUED | OUTPATIENT
Start: 2025-06-09 | End: 2025-06-10 | Stop reason: HOSPADM

## 2025-06-09 RX ADMIN — SODIUM CHLORIDE 75 ML/HR: 0.9 INJECTION, SOLUTION INTRAVENOUS at 11:57

## 2025-06-09 RX ADMIN — FENTANYL CITRATE 25 MCG: 50 INJECTION INTRAMUSCULAR; INTRAVENOUS at 12:55

## 2025-06-09 RX ADMIN — FENTANYL CITRATE 25 MCG: 50 INJECTION INTRAMUSCULAR; INTRAVENOUS at 12:45

## 2025-06-09 RX ADMIN — MIDAZOLAM 0.5 MG: 1 INJECTION INTRAMUSCULAR; INTRAVENOUS at 12:45

## 2025-06-09 RX ADMIN — MIDAZOLAM 1 MG: 1 INJECTION INTRAMUSCULAR; INTRAVENOUS at 12:50

## 2025-06-09 RX ADMIN — FENTANYL CITRATE 50 MCG: 50 INJECTION INTRAMUSCULAR; INTRAVENOUS at 12:51

## 2025-06-09 RX ADMIN — MIDAZOLAM 0.5 MG: 1 INJECTION INTRAMUSCULAR; INTRAVENOUS at 12:55

## 2025-06-09 NOTE — BRIEF OP NOTE (RAD/CATH)
IR BIOPSY LIVER RANDOM Procedure Note    PATIENT NAME: Odin Obrien  : 1990  MRN: [id number]    Pre-op Diagnosis:   1. Elevated LFTs      Post-op Diagnosis:   1. Elevated LFTs        Surgeon:     Blake Brooks MD    Assistants:     Alvarado Chery MD    Estimated Blood Loss: 10 cc    Findings: Limited ultrasound of the liver was performed and 3 random core needle biopsies were obtained.     Specimens: 3 Core needle biopsies placed in formulin for subsequent pathology interpretation    Complications:  No immediate complications    Anesthesia: conscious sedation    Alvarado Chery MD     Date: 2025  Time: 1:11 PM

## 2025-06-09 NOTE — INTERVAL H&P NOTE
"Update: (This section must be completed if the H&P was completed greater than 24 hrs to procedure or admission)    H&P reviewed. After examining the patient, I find no changed to the H&P since it had been written.    /67   Pulse 70   Temp 97.7 °F (36.5 °C) (Temporal)   Resp 18   Ht 6' 3\" (1.905 m)   Wt 122 kg (270 lb)   SpO2 98%   BMI 33.75 kg/m²     Patient re-evaluated. Accept as history and physical.    We will proceed with random liver biopsy today.    Blake Brooks MD/June 9, 2025/12:32 PM  "

## 2025-06-09 NOTE — DISCHARGE INSTRUCTIONS
Percutaneous Liver Biopsy   WHAT YOU NEED TO KNOW:   A PLB is a procedure to remove a sample of tissue from your liver. The sample can be sent to a lab and tested for liver disease, cancer, or infection. After the procedure you may have pain and bruising at the biopsy site. You may also have pain in your right shoulder. These symptoms should get better in 48 to 72 hours.    DISCHARGE INSTRUCTIONS:     [facility] [place] and [facility] patients,  Contact Interventional Radiology at [phone]   [facility] PATIENTS: Contact Interventional Radiology at [phone]   [facility] PATIENTS: Contact Interventional Radiology at [phone] if:    Fever greater than 101 or chills  You have severe pain in your abdomen.    Your abdomen is larger than usual and feels hard.    Your neck is more swollen and you have trouble swallowing.    You feel weak or dizzy.    Your heart is beating faster than usual.   Your pain does not get better after you take pain medicine.    Your wound is red, swollen, or draining pus.   You have nausea or are vomiting.   Your skin is itchy, swollen, or you have a rash.   You have questions or concerns about your condition or care.  Medicines:   Acetaminophen decreases pain and fever. It is available without a doctor's order. Acetaminophen can cause liver damage if not taken correctly.   Take your home medicine as directed.  Resume your normal diet. Small sips of flat soda will help with mild nausea.  Self-care:   Rest as directed. Do not play sports, exercise, or lift anything heavier than 10 pounds for up to 48 hours.     Apply firm, steady pressure if bleeding occurs. A small amount of bleeding from your wound is possible. Apply pressure with a clean gauze or towel for 5 to 10 minutes. Call 911 if bleeding becomes heavy or does not stop.    Ask your healthcare provider when to take your blood thinner or antiplatelet medicine. You may need to wait 24 to 72 hours to take your medicine. This  will prevent bleeding.  Follow up with your healthcare provider as directed: Write down your questions so you remember to ask them during your visits.

## 2025-06-24 ENCOUNTER — TELEPHONE (OUTPATIENT)
Age: 35
End: 2025-06-24

## 2025-06-24 ENCOUNTER — TELEPHONE (OUTPATIENT)
Dept: DENTISTRY | Facility: CLINIC | Age: 35
End: 2025-06-24

## 2025-06-24 NOTE — TELEPHONE ENCOUNTER
Spoke with Mando per website requests to schedule an appt, Mando stated he found another provider.

## 2025-06-24 NOTE — TELEPHONE ENCOUNTER
Returned patient's call to discuss biopsy results. No answer, LVM to return call and requested that he give us a preferred callback time.

## 2025-06-24 NOTE — TELEPHONE ENCOUNTER
Patients GI provider:  CANDIS XIE    Number to return call: ([phone]    Reason for call: Pt returning call to Diane regarding bx results. Please return patients call to further discuss.

## 2025-07-01 NOTE — TELEPHONE ENCOUNTER
Patient returned office call to review results to biopsy patient would like a call from CANDIS mondragon in Dec was R/S per PROVIDER he is R/S with DR Neumann in NOV

## 2025-07-11 ENCOUNTER — APPOINTMENT (OUTPATIENT)
Dept: LAB | Facility: CLINIC | Age: 35
End: 2025-07-11
Payer: COMMERCIAL

## 2025-07-11 DIAGNOSIS — R74.8 ELEVATED LIVER ENZYMES: ICD-10-CM

## 2025-07-11 DIAGNOSIS — R89.7 ABNORMAL BIOPSY RESULT: ICD-10-CM

## 2025-07-11 LAB
ALBUMIN SERPL BCG-MCNC: 4.5 G/DL (ref 3.5–5)
ALP SERPL-CCNC: 70 U/L (ref 34–104)
ALT SERPL W P-5'-P-CCNC: 96 U/L (ref 7–52)
ANION GAP SERPL CALCULATED.3IONS-SCNC: 6 MMOL/L (ref 4–13)
AST SERPL W P-5'-P-CCNC: 38 U/L (ref 13–39)
BASOPHILS # BLD AUTO: 0.05 THOUSANDS/ÂΜL (ref 0–0.1)
BASOPHILS NFR BLD AUTO: 1 % (ref 0–1)
BILIRUB SERPL-MCNC: 1.16 MG/DL (ref 0.2–1)
BUN SERPL-MCNC: 13 MG/DL (ref 5–25)
CALCIUM SERPL-MCNC: 9.6 MG/DL (ref 8.4–10.2)
CHLORIDE SERPL-SCNC: 106 MMOL/L (ref 96–108)
CHOLEST SERPL-MCNC: 235 MG/DL (ref ?–200)
CO2 SERPL-SCNC: 27 MMOL/L (ref 21–32)
CREAT SERPL-MCNC: 0.95 MG/DL (ref 0.6–1.3)
EOSINOPHIL # BLD AUTO: 0.12 THOUSAND/ÂΜL (ref 0–0.61)
EOSINOPHIL NFR BLD AUTO: 2 % (ref 0–6)
ERYTHROCYTE [DISTWIDTH] IN BLOOD BY AUTOMATED COUNT: 13.1 % (ref 11.6–15.1)
EST. AVERAGE GLUCOSE BLD GHB EST-MCNC: 103 MG/DL
GFR SERPL CREATININE-BSD FRML MDRD: 104 ML/MIN/1.73SQ M
GLUCOSE P FAST SERPL-MCNC: 85 MG/DL (ref 65–99)
HBA1C MFR BLD: 5.2 %
HBV CORE AB SER QL: NORMAL
HBV CORE IGM SER QL: NORMAL
HBV SURFACE AG SER QL: NORMAL
HCT VFR BLD AUTO: 48.2 % (ref 36.5–49.3)
HCV AB SER QL: NORMAL
HDLC SERPL-MCNC: 39 MG/DL
HGB BLD-MCNC: 16.9 G/DL (ref 12–17)
IGA SERPL-MCNC: 132 MG/DL (ref 66–433)
IGG SERPL-MCNC: 809 MG/DL (ref 635–1741)
IGM SERPL-MCNC: 122 MG/DL (ref 45–281)
IMM GRANULOCYTES # BLD AUTO: 0.02 THOUSAND/UL (ref 0–0.2)
IMM GRANULOCYTES NFR BLD AUTO: 0 % (ref 0–2)
LDLC SERPL CALC-MCNC: 173 MG/DL (ref 0–100)
LYMPHOCYTES # BLD AUTO: 2.32 THOUSANDS/ÂΜL (ref 0.6–4.47)
LYMPHOCYTES NFR BLD AUTO: 45 % (ref 14–44)
MCH RBC QN AUTO: 31.1 PG (ref 26.8–34.3)
MCHC RBC AUTO-ENTMCNC: 35.1 G/DL (ref 31.4–37.4)
MCV RBC AUTO: 89 FL (ref 82–98)
MONOCYTES # BLD AUTO: 0.49 THOUSAND/ÂΜL (ref 0.17–1.22)
MONOCYTES NFR BLD AUTO: 9 % (ref 4–12)
NEUTROPHILS # BLD AUTO: 2.26 THOUSANDS/ÂΜL (ref 1.85–7.62)
NEUTS SEG NFR BLD AUTO: 43 % (ref 43–75)
NONHDLC SERPL-MCNC: 196 MG/DL
NRBC BLD AUTO-RTO: 0 /100 WBCS
PLATELET # BLD AUTO: 210 THOUSANDS/UL (ref 149–390)
PMV BLD AUTO: 10.4 FL (ref 8.9–12.7)
POTASSIUM SERPL-SCNC: 4 MMOL/L (ref 3.5–5.3)
PROT SERPL-MCNC: 6.9 G/DL (ref 6.4–8.4)
RBC # BLD AUTO: 5.44 MILLION/UL (ref 3.88–5.62)
SODIUM SERPL-SCNC: 139 MMOL/L (ref 135–147)
TRIGL SERPL-MCNC: 113 MG/DL (ref ?–150)
WBC # BLD AUTO: 5.26 THOUSAND/UL (ref 4.31–10.16)

## 2025-07-11 PROCEDURE — 86225 DNA ANTIBODY NATIVE: CPT

## 2025-07-11 PROCEDURE — 86015 ACTIN ANTIBODY EACH: CPT

## 2025-07-11 PROCEDURE — 86381 MITOCHONDRIAL ANTIBODY EACH: CPT

## 2025-07-11 PROCEDURE — 86803 HEPATITIS C AB TEST: CPT

## 2025-07-11 PROCEDURE — 87799 DETECT AGENT NOS DNA QUANT: CPT

## 2025-07-11 PROCEDURE — 86705 HEP B CORE ANTIBODY IGM: CPT

## 2025-07-11 PROCEDURE — 86038 ANTINUCLEAR ANTIBODIES: CPT

## 2025-07-11 PROCEDURE — 86704 HEP B CORE ANTIBODY TOTAL: CPT

## 2025-07-11 PROCEDURE — 82784 ASSAY IGA/IGD/IGG/IGM EACH: CPT

## 2025-07-11 PROCEDURE — 87529 HSV DNA AMP PROBE: CPT

## 2025-07-11 PROCEDURE — 86790 VIRUS ANTIBODY NOS: CPT

## 2025-07-11 PROCEDURE — 87340 HEPATITIS B SURFACE AG IA: CPT

## 2025-07-12 LAB
ACTIN IGG SERPL-ACNC: 11 UNITS (ref 0–19)
MITOCHONDRIA M2 IGG SER-ACNC: <20 UNITS (ref 0–20)

## 2025-07-14 LAB
CMV DNA SERPL NAA+PROBE-ACNC: NOT DETECTED [IU]/ML
EBV DNA # SPEC NAA+PROBE: NOT DETECTED {COPIES}/ML

## 2025-07-15 ENCOUNTER — HOSPITAL ENCOUNTER (EMERGENCY)
Facility: HOSPITAL | Age: 35
Discharge: HOME | End: 2025-07-15
Attending: EMERGENCY MEDICINE | Admitting: EMERGENCY MEDICINE
Payer: COMMERCIAL

## 2025-07-15 VITALS
DIASTOLIC BLOOD PRESSURE: 90 MMHG | HEART RATE: 76 BPM | SYSTOLIC BLOOD PRESSURE: 141 MMHG | RESPIRATION RATE: 18 BRPM | TEMPERATURE: 97.6 F | OXYGEN SATURATION: 99 %

## 2025-07-15 DIAGNOSIS — R74.8 ELEVATED CK: ICD-10-CM

## 2025-07-15 DIAGNOSIS — M79.632 PAIN IN BOTH FOREARMS: Primary | ICD-10-CM

## 2025-07-15 DIAGNOSIS — M79.631 PAIN IN BOTH FOREARMS: Primary | ICD-10-CM

## 2025-07-15 LAB
ANION GAP SERPL CALCULATED.3IONS-SCNC: 5 MMOL/L (ref 4–13)
BUN SERPL-MCNC: 13 MG/DL (ref 5–25)
CALCIUM SERPL-MCNC: 9.1 MG/DL (ref 8.4–10.2)
CHLORIDE SERPL-SCNC: 110 MMOL/L (ref 96–108)
CK SERPL-CCNC: 509 U/L (ref 39–308)
CO2 SERPL-SCNC: 24 MMOL/L (ref 21–32)
CREAT SERPL-MCNC: 0.77 MG/DL (ref 0.6–1.3)
GFR SERPL CREATININE-BSD FRML MDRD: 118 ML/MIN/1.73SQ M
GLUCOSE SERPL-MCNC: 108 MG/DL (ref 65–140)
HSV1 DNA SPEC QL NAA+PROBE: NEGATIVE
HSV2 DNA SPEC QL NAA+PROBE: NEGATIVE
POTASSIUM SERPL-SCNC: 3.7 MMOL/L (ref 3.5–5.3)
SODIUM SERPL-SCNC: 139 MMOL/L (ref 135–147)

## 2025-07-15 PROCEDURE — 99284 EMERGENCY DEPT VISIT MOD MDM: CPT | Performed by: EMERGENCY MEDICINE

## 2025-07-15 PROCEDURE — 36415 COLL VENOUS BLD VENIPUNCTURE: CPT

## 2025-07-15 PROCEDURE — 96372 THER/PROPH/DIAG INJ SC/IM: CPT

## 2025-07-15 PROCEDURE — 99283 EMERGENCY DEPT VISIT LOW MDM: CPT

## 2025-07-15 PROCEDURE — 82550 ASSAY OF CK (CPK): CPT

## 2025-07-15 PROCEDURE — 80048 BASIC METABOLIC PNL TOTAL CA: CPT

## 2025-07-15 RX ORDER — NAPROXEN 500 MG/1
500 TABLET ORAL 2 TIMES DAILY WITH MEALS
Qty: 30 TABLET | Refills: 0 | Status: SHIPPED | OUTPATIENT
Start: 2025-07-15

## 2025-07-15 RX ORDER — KETOROLAC TROMETHAMINE 30 MG/ML
15 INJECTION, SOLUTION INTRAMUSCULAR; INTRAVENOUS ONCE
Status: COMPLETED | OUTPATIENT
Start: 2025-07-15 | End: 2025-07-15

## 2025-07-15 RX ADMIN — KETOROLAC TROMETHAMINE 15 MG: 30 INJECTION, SOLUTION INTRAMUSCULAR; INTRAVENOUS at 10:37

## 2025-07-15 NOTE — ED PROVIDER NOTES
Time reflects when diagnosis was documented in both MDM as applicable and the Disposition within this note       Time User Action Codes Description Comment    7/15/2025 10:24 AM JoseHaritha callejas Add [G90.513] Complex regional pain syndrome type 1 affecting forearm, bilateral     7/15/2025 10:24 AM Haritha Dwyer Remove [G90.513] Complex regional pain syndrome type 1 affecting forearm, bilateral     7/15/2025 10:24 AM ReymundoHaritha Add [M79.632,  M79.631] Pain in both forearms     7/15/2025 10:24 AM ReymundoHaritha Add [R74.8] Elevated CK           ED Disposition       ED Disposition   Discharge    Condition   Stable    Date/Time   Tue Jul 15, 2025 10:29 AM    Comment   Odin Obrien discharge to home/self care.                   Assessment & Plan       Medical Decision Making  Patient is a 34-year-old male with a past medical history of elevated liver enzymes presents to the emergency department with bilateral forearm pain.     Abnormal Vitals: None    Initial Differential and Testing:  Pain bilaterally, normal pulses, no skin changes, intermittent left 1st and 2nd finger digit-low suspicion for compartment syndrome.  Potential for rhabdomyolysis-CK ordered for further evaluation.  BMP ordered for evaluation of potential electrolyte abnormalities and evaluate kidney function.    Initial Treatment: If normal kidney function, will give 15 mg IM Toradol.    Testing Interpretation:  CMP shows creatinine is 0.77-no acute kidney injury.  Will proceed with 15 mg IM Toradol for pain and anti-inflammatory effects.  CK is 509.    Additional Testing: None    Final Differential: Bilateral forearm pain from muscle overuse, elevated CK    After care:   Treatment Plan: Muscle rest for at least with no lifting, adequate hydration to flush out enzymes, naproxen 500 mg by mouth twice daily for at least a week  Follow up: With primary care provider  Return Precautions: If weakness or pain gets substantially worse    Amount and/or  Complexity of Data Reviewed  Labs: ordered. Decision-making details documented in ED Course.    Risk  Prescription drug management.        ED Course as of 07/15/25 1037   Tue Jul 15, 2025   1023 Creatinine: 0.77   1024 Total CK(!): 509  Mild elevation, not at rhabdomyolysis level.       Medications   ketorolac (TORADOL) injection 15 mg (15 mg Intramuscular Given 7/15/25 1037)       ED Risk Strat Scores                    No data recorded        SBIRT 22yo+      Flowsheet Row Most Recent Value   Initial Alcohol Screen: US AUDIT-C     1. How often do you have a drink containing alcohol? 0 Filed at: 07/15/2025 0841   2. How many drinks containing alcohol do you have on a typical day you are drinking?  0 Filed at: 07/15/2025 0841   3a. Male UNDER 65: How often do you have five or more drinks on one occasion? 0 Filed at: 07/15/2025 0841   Audit-C Score 0 Filed at: 07/15/2025 0841   ESLA: How many times in the past year have you...    Used an illegal drug or used a prescription medication for non-medical reasons? Never Filed at: 07/15/2025 0841                            History of Present Illness       Chief Complaint   Patient presents with    Arm Pain     Over the weekend, the patient was helping his in laws move and developed pain in b/l forearms.  Pain moved down his arms.  Now he is having difficulty moving his hands to due to tightness in hands.        Past Medical History[1]   Past Surgical History[2]   Family History[3]   Social History[4]   E-Cigarette/Vaping    E-Cigarette Use Never User       E-Cigarette/Vaping Substances    Nicotine No     THC No     CBD No     Flavoring No     Other No     Unknown No       I have reviewed and agree with the history as documented.     Patient is a 34-year-old male with a past medical history of elevated liver enzymes presents to the emergency department with bilateral forearm pain.  He notes this pain started after helping his in-laws move on 7/12/2025.  He was moving heavy  furniture in the heat, sweating heavily.  He did not stretch after.  He then continued to help move the next 2 days.  He states he noticed weakness yesterday in his  and while typing.  He reports feeling tightness in his forearms as well as pain.  He has tried ibuprofen at night, Epsom salt, ice/heat packs prior to arrival.  He came in today as he could not buckle his child into the car seat this morning.  He reports intermittent numbness and tingling in the left 1st and 2nd digit.  He denies any neck pain or shoulder pain.  He denies any injury while moving.  He reports drinking water while moving.  He reports potentially decreased frequency in urination.          Review of Systems        Objective       ED Triage Vitals   Temperature Pulse Blood Pressure Respirations SpO2 Patient Position - Orthostatic VS   07/15/25 0839 07/15/25 0839 07/15/25 0839 07/15/25 0839 07/15/25 0839 --   97.6 °F (36.4 °C) 76 141/90 18 99 %       Temp Source Heart Rate Source BP Location FiO2 (%) Pain Score    07/15/25 0839 -- -- -- 07/15/25 1037    Temporal    7      Vitals      Date and Time Temp Pulse SpO2 Resp BP Pain Score FACES Pain Rating User   07/15/25 1037 -- -- -- -- -- 7 -- CS   07/15/25 0839 97.6 °F (36.4 °C) 76 99 % 18 141/90 -- -- BMM            Physical Exam  Constitutional:       General: He is not in acute distress.     Appearance: Normal appearance. He is normal weight.   HENT:      Head: Normocephalic and atraumatic.     Cardiovascular:      Rate and Rhythm: Normal rate and regular rhythm.      Pulses:           Radial pulses are 2+ on the right side and 2+ on the left side.   Pulmonary:      Effort: Pulmonary effort is normal.      Breath sounds: Normal breath sounds.   Abdominal:      General: Abdomen is flat.      Tenderness: There is no abdominal tenderness.     Musculoskeletal:      Right shoulder: No tenderness.      Left shoulder: No tenderness.      Right elbow: No tenderness.      Left elbow: No  tenderness.      Right forearm: Tenderness (Ventral soft tissue tenderness) present. No swelling.      Left forearm: Tenderness (Ventral soft tissue tenderness) present. No swelling.      Right hand: Decreased strength (Decreased  strength). Normal strength of finger abduction, thumb/finger opposition and wrist extension. Normal sensation.      Left hand: Decreased strength (Decreased  strength). Normal strength of finger abduction, thumb/finger opposition and wrist extension. Normal sensation.      Cervical back: No bony tenderness.      Right lower leg: No edema.      Left lower leg: Edema present.     Skin:     Comments: No erythema, pallor, or other skin changes of bilateral forearms.     Neurological:      General: No focal deficit present.      Mental Status: He is alert.         Results Reviewed       Procedure Component Value Units Date/Time    Basic metabolic panel [[id number]]  (Abnormal) Collected: 07/15/25 0916    Lab Status: Final result Specimen: Blood from Arm, Right Updated: 07/15/25 0951     Sodium 139 mmol/L      Potassium 3.7 mmol/L      Chloride 110 mmol/L      CO2 24 mmol/L      ANION GAP 5 mmol/L      BUN 13 mg/dL      Creatinine 0.77 mg/dL      Glucose 108 mg/dL      Calcium 9.1 mg/dL      eGFR 118 ml/min/1.73sq m     Narrative:      National Kid[employer] Foundation guidelines for Chronic Kidney Disease (CKD):     Stage 1 with normal or high GFR (GFR > 90 mL/min/1.73 square meters)    Stage 2 Mild CKD (GFR = 60-89 mL/min/1.73 square meters)    Stage 3A Moderate CKD (GFR = 45-59 mL/min/1.73 square meters)    Stage 3B Moderate CKD (GFR = 30-44 mL/min/1.73 square meters)    Stage 4 Severe CKD (GFR = 15-29 mL/min/1.73 square meters)    Stage 5 End Stage CKD (GFR <15 mL/min/1.73 square meters)  Note: GFR calculation is accurate only with a steady state creatinine    CK [[id number]]  (Abnormal) Collected: 07/15/25 0916    Lab Status: Final result Specimen: Blood from Arm, Right Updated:  07/15/25 0951     Total  U/L             No orders to display       Procedures    ED Medication and Procedure Management   Prior to Admission Medications   Prescriptions Last Dose Informant Patient Reported? Taking?   ondansetron (ZOFRAN-ODT) 4 mg disintegrating tablet  Self No No   Sig: Take 1 tablet (4 mg total) by mouth every 6 (six) hours as needed for nausea or vomiting      Facility-Administered Medications: None     Patient's Medications   Discharge Prescriptions    NAPROXEN (NAPROSYN) 500 MG TABLET    Take 1 tablet (500 mg total) by mouth 2 (two) times a day with meals       Start Date: 7/15/2025 End Date: --       Order Dose: 500 mg       Quantity: 30 tablet    Refills: 0     No discharge procedures on file.  ED SEPSIS DOCUMENTATION   Time reflects when diagnosis was documented in both MDM as applicable and the Disposition within this note       Time User Action Codes Description Comment    7/15/2025 10:24 AM Haritha Dwyer [G90.513] Complex regional pain syndrome type 1 affecting forearm, bilateral     7/15/2025 10:24 AM Haritha Dwyer Remove [G90.513] Complex regional pain syndrome type 1 affecting forearm, bilateral     7/15/2025 10:24 AM Haritha Dwyer [M79.632,  M79.631] Pain in both forearms     7/15/2025 10:24 AM Haritha Dwyer [R74.8] Elevated CK                    [1]   Past Medical History:  Diagnosis Date    Chest pain on breathing 06/05/2024    Gout     Internal derangement of knee     Left - last assessed: Dec 22, 2015    Syncope 02/11/2023   [2]   Past Surgical History:  Procedure Laterality Date    IR BIOPSY LIVER RANDOM  6/9/2025    KNEE CARTILAGE SURGERY Left     2 surgeries    TRICEPS TENDON RELEASE Left     tricep repair    WISDOM TOOTH EXTRACTION     [3]   Family History  Problem Relation Name Age of Onset    Breast cancer Mother Yancyjamar Cashjoseph     Hodgkin's lymphoma Mother Yancyjamar Cashjoseph     Heart defect Mother Yancy Obrien     No Known Problems Father     [4]    Social History  Tobacco Use    Smoking status: Never     Passive exposure: Never    Smokeless tobacco: Never   Vaping Use    Vaping status: Never Used   Substance Use Topics    Alcohol use: Yes     Comment: social     Drug use: Never        Haritha Dwyer DO  07/15/25 1038

## 2025-07-16 LAB
DSDNA IGG SERPL IA-ACNC: <0.9 IU/ML (ref ?–15)
NUCLEAR IGG SER IA-RTO: <0.09 RATIO (ref ?–1)

## 2025-07-18 LAB
HEV IGM SER-ACNC: NOT DETECTED
Lab: NOT DETECTED